# Patient Record
Sex: FEMALE | Race: WHITE | URBAN - METROPOLITAN AREA
[De-identification: names, ages, dates, MRNs, and addresses within clinical notes are randomized per-mention and may not be internally consistent; named-entity substitution may affect disease eponyms.]

---

## 2019-01-06 ENCOUNTER — INPATIENT (INPATIENT)
Facility: HOSPITAL | Age: 84
LOS: 0 days | Discharge: HOME HEALTH | End: 2019-01-07
Attending: FAMILY MEDICINE | Admitting: FAMILY MEDICINE
Payer: MEDICARE

## 2019-01-06 VITALS
DIASTOLIC BLOOD PRESSURE: 119 MMHG | RESPIRATION RATE: 16 BRPM | WEIGHT: 139.99 LBS | TEMPERATURE: 99 F | OXYGEN SATURATION: 100 % | SYSTOLIC BLOOD PRESSURE: 199 MMHG | HEART RATE: 90 BPM

## 2019-01-06 DIAGNOSIS — I10 ESSENTIAL (PRIMARY) HYPERTENSION: ICD-10-CM

## 2019-01-06 DIAGNOSIS — Z96.649 PRESENCE OF UNSPECIFIED ARTIFICIAL HIP JOINT: Chronic | ICD-10-CM

## 2019-01-06 DIAGNOSIS — Z98.890 OTHER SPECIFIED POSTPROCEDURAL STATES: Chronic | ICD-10-CM

## 2019-01-06 DIAGNOSIS — G20 PARKINSON'S DISEASE: ICD-10-CM

## 2019-01-06 DIAGNOSIS — Z29.9 ENCOUNTER FOR PROPHYLACTIC MEASURES, UNSPECIFIED: ICD-10-CM

## 2019-01-06 DIAGNOSIS — I48.91 UNSPECIFIED ATRIAL FIBRILLATION: ICD-10-CM

## 2019-01-06 DIAGNOSIS — G45.9 TRANSIENT CEREBRAL ISCHEMIC ATTACK, UNSPECIFIED: ICD-10-CM

## 2019-01-06 DIAGNOSIS — Z71.89 OTHER SPECIFIED COUNSELING: ICD-10-CM

## 2019-01-06 LAB
ALBUMIN SERPL ELPH-MCNC: 3.5 G/DL — SIGNIFICANT CHANGE UP (ref 3.3–5)
ALP SERPL-CCNC: 122 U/L — HIGH (ref 40–120)
ALT FLD-CCNC: 48 U/L — SIGNIFICANT CHANGE UP (ref 12–78)
ANION GAP SERPL CALC-SCNC: 7 MMOL/L — SIGNIFICANT CHANGE UP (ref 5–17)
APPEARANCE UR: CLEAR — SIGNIFICANT CHANGE UP
APTT BLD: 28.3 SEC — SIGNIFICANT CHANGE UP (ref 27.5–36.3)
AST SERPL-CCNC: 39 U/L — HIGH (ref 15–37)
BACTERIA # UR AUTO: ABNORMAL
BASOPHILS # BLD AUTO: 0.07 K/UL — SIGNIFICANT CHANGE UP (ref 0–0.2)
BASOPHILS NFR BLD AUTO: 1.1 % — SIGNIFICANT CHANGE UP (ref 0–2)
BILIRUB SERPL-MCNC: 0.5 MG/DL — SIGNIFICANT CHANGE UP (ref 0.2–1.2)
BILIRUB UR-MCNC: NEGATIVE — SIGNIFICANT CHANGE UP
BUN SERPL-MCNC: 24 MG/DL — HIGH (ref 7–23)
CALCIUM SERPL-MCNC: 9.1 MG/DL — SIGNIFICANT CHANGE UP (ref 8.5–10.1)
CHLORIDE SERPL-SCNC: 107 MMOL/L — SIGNIFICANT CHANGE UP (ref 96–108)
CO2 SERPL-SCNC: 28 MMOL/L — SIGNIFICANT CHANGE UP (ref 22–31)
COLOR SPEC: YELLOW — SIGNIFICANT CHANGE UP
COMMENT - URINE: SIGNIFICANT CHANGE UP
CREAT SERPL-MCNC: 0.79 MG/DL — SIGNIFICANT CHANGE UP (ref 0.5–1.3)
DIFF PNL FLD: ABNORMAL
EOSINOPHIL # BLD AUTO: 0.03 K/UL — SIGNIFICANT CHANGE UP (ref 0–0.5)
EOSINOPHIL NFR BLD AUTO: 0.5 % — SIGNIFICANT CHANGE UP (ref 0–6)
EPI CELLS # UR: SIGNIFICANT CHANGE UP
ETHANOL SERPL-MCNC: <10 MG/DL — SIGNIFICANT CHANGE UP (ref 0–10)
GLUCOSE SERPL-MCNC: 113 MG/DL — HIGH (ref 70–99)
GLUCOSE UR QL: NEGATIVE MG/DL — SIGNIFICANT CHANGE UP
HCT VFR BLD CALC: 38.3 % — SIGNIFICANT CHANGE UP (ref 34.5–45)
HGB BLD-MCNC: 12.2 G/DL — SIGNIFICANT CHANGE UP (ref 11.5–15.5)
IMM GRANULOCYTES NFR BLD AUTO: 0.5 % — SIGNIFICANT CHANGE UP (ref 0–1.5)
INR BLD: 1.09 RATIO — SIGNIFICANT CHANGE UP (ref 0.88–1.16)
KETONES UR-MCNC: NEGATIVE — SIGNIFICANT CHANGE UP
LACTATE SERPL-SCNC: 1.9 MMOL/L — SIGNIFICANT CHANGE UP (ref 0.7–2)
LEUKOCYTE ESTERASE UR-ACNC: ABNORMAL
LYMPHOCYTES # BLD AUTO: 1.32 K/UL — SIGNIFICANT CHANGE UP (ref 1–3.3)
LYMPHOCYTES # BLD AUTO: 19.9 % — SIGNIFICANT CHANGE UP (ref 13–44)
MCHC RBC-ENTMCNC: 31.5 PG — SIGNIFICANT CHANGE UP (ref 27–34)
MCHC RBC-ENTMCNC: 31.9 GM/DL — LOW (ref 32–36)
MCV RBC AUTO: 99 FL — SIGNIFICANT CHANGE UP (ref 80–100)
MONOCYTES # BLD AUTO: 0.62 K/UL — SIGNIFICANT CHANGE UP (ref 0–0.9)
MONOCYTES NFR BLD AUTO: 9.4 % — SIGNIFICANT CHANGE UP (ref 2–14)
NEUTROPHILS # BLD AUTO: 4.55 K/UL — SIGNIFICANT CHANGE UP (ref 1.8–7.4)
NEUTROPHILS NFR BLD AUTO: 68.6 % — SIGNIFICANT CHANGE UP (ref 43–77)
NITRITE UR-MCNC: NEGATIVE — SIGNIFICANT CHANGE UP
NRBC # BLD: 0 /100 WBCS — SIGNIFICANT CHANGE UP (ref 0–0)
PH UR: 6 — SIGNIFICANT CHANGE UP (ref 5–8)
PLATELET # BLD AUTO: 195 K/UL — SIGNIFICANT CHANGE UP (ref 150–400)
POTASSIUM SERPL-MCNC: 3.9 MMOL/L — SIGNIFICANT CHANGE UP (ref 3.5–5.3)
POTASSIUM SERPL-SCNC: 3.9 MMOL/L — SIGNIFICANT CHANGE UP (ref 3.5–5.3)
PROT SERPL-MCNC: 7.3 GM/DL — SIGNIFICANT CHANGE UP (ref 6–8.3)
PROT UR-MCNC: 15 MG/DL
PROTHROM AB SERPL-ACNC: 12.1 SEC — SIGNIFICANT CHANGE UP (ref 10–12.9)
RBC # BLD: 3.87 M/UL — SIGNIFICANT CHANGE UP (ref 3.8–5.2)
RBC # FLD: 14.9 % — HIGH (ref 10.3–14.5)
RBC CASTS # UR COMP ASSIST: SIGNIFICANT CHANGE UP /HPF (ref 0–4)
SODIUM SERPL-SCNC: 142 MMOL/L — SIGNIFICANT CHANGE UP (ref 135–145)
SP GR SPEC: 1.02 — SIGNIFICANT CHANGE UP (ref 1.01–1.02)
TROPONIN I SERPL-MCNC: 0.01 NG/ML — SIGNIFICANT CHANGE UP (ref 0.01–0.04)
TROPONIN I SERPL-MCNC: <0.015 NG/ML — SIGNIFICANT CHANGE UP (ref 0.01–0.04)
UROBILINOGEN FLD QL: NEGATIVE MG/DL — SIGNIFICANT CHANGE UP
WBC # BLD: 6.62 K/UL — SIGNIFICANT CHANGE UP (ref 3.8–10.5)
WBC # FLD AUTO: 6.62 K/UL — SIGNIFICANT CHANGE UP (ref 3.8–10.5)
WBC UR QL: SIGNIFICANT CHANGE UP

## 2019-01-06 PROCEDURE — 70450 CT HEAD/BRAIN W/O DYE: CPT | Mod: 26

## 2019-01-06 PROCEDURE — 99284 EMERGENCY DEPT VISIT MOD MDM: CPT

## 2019-01-06 PROCEDURE — 99285 EMERGENCY DEPT VISIT HI MDM: CPT

## 2019-01-06 PROCEDURE — 93010 ELECTROCARDIOGRAM REPORT: CPT

## 2019-01-06 RX ORDER — HEPARIN SODIUM 5000 [USP'U]/ML
5000 INJECTION INTRAVENOUS; SUBCUTANEOUS EVERY 12 HOURS
Qty: 0 | Refills: 0 | Status: DISCONTINUED | OUTPATIENT
Start: 2019-01-06 | End: 2019-01-07

## 2019-01-06 RX ORDER — ASPIRIN/CALCIUM CARB/MAGNESIUM 324 MG
325 TABLET ORAL ONCE
Qty: 0 | Refills: 0 | Status: COMPLETED | OUTPATIENT
Start: 2019-01-06 | End: 2019-01-06

## 2019-01-06 RX ORDER — ASPIRIN/CALCIUM CARB/MAGNESIUM 324 MG
325 TABLET ORAL DAILY
Qty: 0 | Refills: 0 | Status: DISCONTINUED | OUTPATIENT
Start: 2019-01-07 | End: 2019-01-07

## 2019-01-06 RX ADMIN — Medication 325 MILLIGRAM(S): at 22:36

## 2019-01-06 RX ADMIN — HEPARIN SODIUM 5000 UNIT(S): 5000 INJECTION INTRAVENOUS; SUBCUTANEOUS at 22:37

## 2019-01-06 NOTE — ED PROVIDER NOTE - NS_ ATTENDINGSCRIBEDETAILS _ED_A_ED_FT
I Fausto Reddy MD saw and examined the patient. Scribe documented for me and under my supervision. I have modified the scribe's documentation where necessary to reflect my history, physical exam and other relevant documentations pertinent to the care of the patient.

## 2019-01-06 NOTE — ED ADULT NURSE NOTE - NSIMPLEMENTINTERV_GEN_ALL_ED
Implemented All Fall with Harm Risk Interventions:  [place] to call system. Call bell, personal items and telephone within reach. Instruct patient to call for assistance. Room bathroom lighting operational. Non-slip footwear when patient is off stretcher. Physically safe environment: no spills, clutter or unnecessary equipment. Stretcher in lowest position, wheels locked, appropriate side rails in place. Provide visual cue, wrist band, yellow gown, etc. Monitor gait and stability. Monitor for mental status changes and reorient to person, place, and time. Review medications for side effects contributing to fall risk. Reinforce activity limits and safety measures with patient and family. Provide visual clues: red socks.

## 2019-01-06 NOTE — H&P ADULT - PROBLEM SELECTOR PLAN 2
~cont. to monitor  ~not currently on any known home medications ~cont. ASA 325mg po daily  ~f/u w/ Cardiology in the am  ~currently rate controlled  ~[id number] Score is 6 (patient will likely require DOAC therapy)

## 2019-01-06 NOTE — ED ADULT NURSE NOTE - CHPI ED NUR SYMPTOMS NEG
no vomiting/no loss of consciousness/no nausea/no numbness/no blurred vision/no dizziness/no fever/no weakness

## 2019-01-06 NOTE — H&P ADULT - PROBLEM SELECTOR PLAN 4
IMPROVE VTE Risk Score is 2  [  ] Previous VTE                                                  3  [  ] Thrombophilia                                               2  [  ] Lower limb paralysis                                      2        (unable to hold up >15 seconds)    [  ] Current Cancer                                              2         (within 6 months)  [X] Immobilization > 24 hrs                                1  [  ] ICU/CCU stay > 24 hours                              1  [X] Age > 60                                                      1  ~cont. Heparin sq ~cont. to monitor  ~not currently on any known home medications ~cont. to monitor  ~not currently on any known home medications  ~BP Target =>  systolic blood pressure =185 mmHg and diastolic blood pressure =110 mmHg

## 2019-01-06 NOTE — ED PROVIDER NOTE - OBJECTIVE STATEMENT
89 y/o f with PMHx of Parkinson's dementia, HTN, osteopenia presenting to the ED BIB son c/o confusion today. Pt found by son in chair, possibly having slid out of chair, confused and unable to  her cellphone from floor when asked for it. At baseline, pt able to answer questions and able to follow commands. Baseline retrograde dementia, unable to recall certain things but able to recall  address, season, however, today she is unable to do either. No focal necrologic deficits or facial droop. 89 y/o f with PMHx of Parkinson's dementia, HTN, osteopenia presenting to the ED BIB son c/o confusion today. Pt found by son in chair, possibly having slid out of chair, confused and unable to  her cellphone from floor when asked for it. At baseline, pt able to answer questions and able to follow commands. Baseline retrograde dementia, unable to recall certain things but able to recall  address, season, however, today she is unable to do either. No focal necrologic deficits or facial droop. Pt unable to provide hx secondary to current mental status, hx obtained from family at bedside.

## 2019-01-06 NOTE — PROVIDER CONTACT NOTE (OTHER) - SITUATION
Consult for Dr. Palla. S/w Lori from [facility]
89 y/o F w/ confusion and expressive aphasia  Service aware - [place]

## 2019-01-06 NOTE — H&P ADULT - ASSESSMENT
89 y/o F PMHx significant for Parkinson's dementia, HTN, osteopenia BIB family for further evaluation of increased confusion. As per patient's family at the bedside the patient was reportedly increasingly "confused" unable to follow commands, or answer questions appropriately. In triage code stroke was called. Vital signs => /119, HR 90/min, RR 16/min. CT Head => No acute pathology to note. Chronic lacunar infarcts in bilateral basal ganglia are present. There is periventricular and subcortical white matter hypoattenuation,  most compatible with severe chronic microvascular ischemic changes. Labs => BUN/Cr 24/0.79.

## 2019-01-06 NOTE — PATIENT PROFILE ADULT - NSPROSPHOSPCHAPLAINYN_GEN_A_NUR
I have reviewed discharge instructions with the parent. The parent verbalized understanding.  Pt ambulated out of Ed in stable condition with no distress noted and no complaints voiced with mother
no

## 2019-01-06 NOTE — CONSULT NOTE ADULT - ASSESSMENT
Patient is a 89 y/o female PMHx of dementia, HTN, osteopenia presenting to the ED BIB son c/o confusion and expressive aphasia today at 3pm. A code stroke was called, HCT negative for acute infarct, chronic b/l BG lacunar infarct present.    A/P: TIA vs acute delirium   - No IV tpa given due to resolving sxs  - Admit to tele  - BP elevated in /102, son states patient was previously on HCTZ  - Cardiac consult  - cardiac echo  - Start ASA 325mg  - Full dose Statin  - Dysphagia screen  - SQH, SCDs for DVT prophylaxis  - MRI/A brain-carotids w/o gado  - PT eval  - Speech/swallow eval    D/w Dr. Swanson 89 y/o female PMHx of dementia, HTN, osteopenia presenting to the ED at 16.41 PM BIB son c/o confusion and expressive aphasia today noted at 3.00 pm. A code stroke was called, HCT negative for acute infarct, chronic b/l BG lacunar infarct present. NIHSS -1, aphasia improved on exam.    # TIA vs acute delirium; No IV tpa given due to resolving sxs    - Admit to tele  - BP elevated in /102, son states patient was previously on HCTZ, optImize BP  - Cardiac consult  - cardiac echo  - Start ASA 325mg  - Full dose Statin  - Dysphagia screen  - SQH, SCDs for DVT prophylaxis  - MRI/A brain-carotids w/o gado  - PT eval  - Speech/swallow eval    Neurology attending:  ------------------------------  Pt seen and examined, total resolution of aphasia, now in A. fib  Agree with above plan    D/W pts son and Dr. Neal

## 2019-01-06 NOTE — ED PROVIDER NOTE - ATTENDING CONTRIBUTION TO CARE
I Fausto Reddy MD saw and examined the patient. MLP saw and examined the patient under my supervision. I discussed the care of the patient with MLP and agree with MLP's plan, assessment and care of the patient while in the ED.

## 2019-01-06 NOTE — ED ADULT TRIAGE NOTE - CHIEF COMPLAINT QUOTE
Patient presents with expressive aphasia since 3 pm, patient was sitting at table with family and was unsure of what her phone was and was not answering questions as quickly as usual. Patient does not have unilateral weakness, coherent clear speech. Patient evaluated by Dr Reddy evaluated at triage called code stroke

## 2019-01-06 NOTE — H&P ADULT - PROBLEM SELECTOR PLAN 1
~admit to Telemetry  ~serial Malaika/EKGs  ~f/u w/ Neurology in the am  ~neuro checks q4h  ~fall precautions  ~aspiration precautions   ~dysphagia screen  ~as per Neurology f/u MR Brain  ~f/u MRA H+N  ~no rtPA given as patient's NIHSS returned to 0 in the ED  ~f/u lipid profile  ~f/u TFTs  ~cont. ASA 325mg po daily

## 2019-01-06 NOTE — ED ADULT NURSE NOTE - ED STAT RN HANDOFF DETAILS
Received report from SOPHIE Ruff and reassessed patient. Agree with the previous RN assessment. Patient has an NIH at 0. A+Ox4 and is at her baseline status. Patient is awaiting hospitalist MD admission assessment and bed placement. Patient and family updated on status and plan of care.

## 2019-01-06 NOTE — ED PROVIDER NOTE - CARE PLAN
Principal Discharge DX:	TIA (transient ischemic attack)  Secondary Diagnosis:	Altered mental status, unspecified altered mental status type

## 2019-01-06 NOTE — H&P ADULT - HISTORY OF PRESENT ILLNESS
89 y/o F PMHx significant for Parkinson's dementia, HTN, osteopenia BIB family for further evaluation of increased confusion. As per patient's family at the bedside the patient was reportedly increasingly "confused" unable to follow commands, or answer questions appropriately. In triage code stroke was called. Vital signs => /119, HR 90/min, RR 16/min. CT Head => No acute pathology to note. Chronic lacunar infarcts in bilateral basal ganglia are present. There is periventricular and subcortical white matter hypoattenuation,  most compatible with severe chronic microvascular ischemic changes. Labs => BUN/Cr 24/0.79. 89 y/o F PMHx significant for Parkinson's dementia, HTN, osteopenia BIB family for further evaluation of increased confusion. As per patient's family at the bedside the patient was reportedly increasingly "confused" unable to follow commands, or answer questions appropriately. In triage code stroke was called. Vital signs => /119, HR 90/min, RR 16/min. CT Head => No acute pathology to note. Chronic lacunar infarcts in bilateral basal ganglia are present. There is periventricular and subcortical white matter hypoattenuation,  most compatible with severe chronic microvascular ischemic changes. Labs => BUN/Cr 24/0.79. NIHSS was 0. 89 y/o F PMHx significant for Dementia, HTN, osteopenia BIB family for further evaluation of increased confusion. As per patient's family at the bedside the patient was reportedly increasingly "confused" unable to follow commands, or answer questions appropriately. In triage code stroke was called. Vital signs => /119, HR 90/min, RR 16/min. CT Head => No acute pathology to note. Chronic lacunar infarcts in bilateral basal ganglia are present. There is periventricular and subcortical white matter hypoattenuation,  most compatible with severe chronic microvascular ischemic changes. Labs => BUN/Cr 24/0.79. NIHSS was 0.

## 2019-01-06 NOTE — ED ADULT NURSE NOTE - OBJECTIVE STATEMENT
pt is a 89 y/o female w/ pmhx of Parkinsons dementia (with short term memory loss), and HTN, presenting today for confusion and difficulty following basic instruction at approx 1500 today. per family maintained consciousness and did not exhibit slurring of speech, aphasia or dysarthria. pt awake alert and conversive at this time oriented x2. pt speech is clear and without hesitation. no new facial asymmetry  (right lower lip tremor per baseline). no unilateral weakness or neglect. no focal deficits.

## 2019-01-06 NOTE — ED PROVIDER NOTE - NEUROLOGICAL, MLM
Awake, alert and oriented to self only, no focal deficits, no motor or sensory deficits, cranial nerves 2-12 intact, finger to nose motion intact, no nuchal rigidity, GCS of 15 Awake, alert and oriented to self only, no focal deficits, no motor or sensory deficits, cranial nerves 2-12 intact, finger to nose motion intact, no nuchal rigidity, GCS of 15, NIH 0

## 2019-01-06 NOTE — H&P ADULT - PROBLEM SELECTOR PLAN 5
IMPROVE VTE Risk Score is 2  [  ] Previous VTE                                                  3  [  ] Thrombophilia                                               2  [  ] Lower limb paralysis                                      2        (unable to hold up >15 seconds)    [  ] Current Cancer                                              2         (within 6 months)  [X] Immobilization > 24 hrs                                1  [  ] ICU/CCU stay > 24 hours                              1  [X] Age > 60                                                      1  ~cont. Heparin sq ~as per patient's son at the bedside the patient's best known wishes concerning code status were to be DNR/DNI. MOLST forms as well as capacity were signed per protocol.  Total time; 16minutes

## 2019-01-06 NOTE — H&P ADULT - NSHPPHYSICALEXAM_GEN_ALL_CORE
Vital Signs Last 24 Hrs  T(C): 37.1 (06 Jan 2019 16:41), Max: 37.1 (06 Jan 2019 16:41)  T(F): 98.7 (06 Jan 2019 16:41), Max: 98.7 (06 Jan 2019 16:41)  HR: 90 (06 Jan 2019 16:41) (90 - 90)  BP: 199/119 (06 Jan 2019 16:41) (199/119 - 199/119)  RR: 16 (06 Jan 2019 16:41) (16 - 16)  SpO2: 100% (06 Jan 2019 16:41) (100% - 100%)

## 2019-01-06 NOTE — ED ADULT NURSE REASSESSMENT NOTE - NS ED NURSE REASSESS COMMENT FT1
pt evaled by neuro PA, is not a candidate for TPA at this time. pending further work up. pt assisted to bedpan and provided for pericare. will cont to monitor.

## 2019-01-07 ENCOUNTER — TRANSCRIPTION ENCOUNTER (OUTPATIENT)
Age: 84
End: 2019-01-07

## 2019-01-07 VITALS — WEIGHT: 141.1 LBS

## 2019-01-07 DIAGNOSIS — F03.90 UNSPECIFIED DEMENTIA WITHOUT BEHAVIORAL DISTURBANCE: ICD-10-CM

## 2019-01-07 DIAGNOSIS — I10 ESSENTIAL (PRIMARY) HYPERTENSION: ICD-10-CM

## 2019-01-07 LAB
ALBUMIN SERPL ELPH-MCNC: 2.9 G/DL — LOW (ref 3.3–5)
ALP SERPL-CCNC: 104 U/L — SIGNIFICANT CHANGE UP (ref 40–120)
ALT FLD-CCNC: 38 U/L — SIGNIFICANT CHANGE UP (ref 12–78)
ANION GAP SERPL CALC-SCNC: 7 MMOL/L — SIGNIFICANT CHANGE UP (ref 5–17)
AST SERPL-CCNC: 27 U/L — SIGNIFICANT CHANGE UP (ref 15–37)
BASOPHILS # BLD AUTO: 0.05 K/UL — SIGNIFICANT CHANGE UP (ref 0–0.2)
BASOPHILS NFR BLD AUTO: 0.8 % — SIGNIFICANT CHANGE UP (ref 0–2)
BILIRUB SERPL-MCNC: 0.8 MG/DL — SIGNIFICANT CHANGE UP (ref 0.2–1.2)
BUN SERPL-MCNC: 15 MG/DL — SIGNIFICANT CHANGE UP (ref 7–23)
CALCIUM SERPL-MCNC: 8.7 MG/DL — SIGNIFICANT CHANGE UP (ref 8.5–10.1)
CHLORIDE SERPL-SCNC: 107 MMOL/L — SIGNIFICANT CHANGE UP (ref 96–108)
CHOLEST SERPL-MCNC: 176 MG/DL — SIGNIFICANT CHANGE UP (ref 10–199)
CO2 SERPL-SCNC: 29 MMOL/L — SIGNIFICANT CHANGE UP (ref 22–31)
CREAT SERPL-MCNC: 0.7 MG/DL — SIGNIFICANT CHANGE UP (ref 0.5–1.3)
EOSINOPHIL # BLD AUTO: 0.08 K/UL — SIGNIFICANT CHANGE UP (ref 0–0.5)
EOSINOPHIL NFR BLD AUTO: 1.2 % — SIGNIFICANT CHANGE UP (ref 0–6)
FOLATE SERPL-MCNC: >20 NG/ML — SIGNIFICANT CHANGE UP
GLUCOSE SERPL-MCNC: 106 MG/DL — HIGH (ref 70–99)
HBA1C BLD-MCNC: 6.3 % — HIGH (ref 4–5.6)
HCT VFR BLD CALC: 35.4 % — SIGNIFICANT CHANGE UP (ref 34.5–45)
HDLC SERPL-MCNC: 62 MG/DL — SIGNIFICANT CHANGE UP
HGB BLD-MCNC: 11.3 G/DL — LOW (ref 11.5–15.5)
IMM GRANULOCYTES NFR BLD AUTO: 0.3 % — SIGNIFICANT CHANGE UP (ref 0–1.5)
LIPID PNL WITH DIRECT LDL SERPL: 104 MG/DL — SIGNIFICANT CHANGE UP
LYMPHOCYTES # BLD AUTO: 1.53 K/UL — SIGNIFICANT CHANGE UP (ref 1–3.3)
LYMPHOCYTES # BLD AUTO: 23.4 % — SIGNIFICANT CHANGE UP (ref 13–44)
MCHC RBC-ENTMCNC: 30.8 PG — SIGNIFICANT CHANGE UP (ref 27–34)
MCHC RBC-ENTMCNC: 31.9 GM/DL — LOW (ref 32–36)
MCV RBC AUTO: 96.5 FL — SIGNIFICANT CHANGE UP (ref 80–100)
MONOCYTES # BLD AUTO: 0.69 K/UL — SIGNIFICANT CHANGE UP (ref 0–0.9)
MONOCYTES NFR BLD AUTO: 10.6 % — SIGNIFICANT CHANGE UP (ref 2–14)
NEUTROPHILS # BLD AUTO: 4.17 K/UL — SIGNIFICANT CHANGE UP (ref 1.8–7.4)
NEUTROPHILS NFR BLD AUTO: 63.7 % — SIGNIFICANT CHANGE UP (ref 43–77)
NRBC # BLD: 0 /100 WBCS — SIGNIFICANT CHANGE UP (ref 0–0)
PLATELET # BLD AUTO: 191 K/UL — SIGNIFICANT CHANGE UP (ref 150–400)
POTASSIUM SERPL-MCNC: 3.7 MMOL/L — SIGNIFICANT CHANGE UP (ref 3.5–5.3)
POTASSIUM SERPL-SCNC: 3.7 MMOL/L — SIGNIFICANT CHANGE UP (ref 3.5–5.3)
PROT SERPL-MCNC: 6.3 GM/DL — SIGNIFICANT CHANGE UP (ref 6–8.3)
RBC # BLD: 3.67 M/UL — LOW (ref 3.8–5.2)
RBC # FLD: 14.8 % — HIGH (ref 10.3–14.5)
SODIUM SERPL-SCNC: 143 MMOL/L — SIGNIFICANT CHANGE UP (ref 135–145)
TOTAL CHOLESTEROL/HDL RATIO MEASUREMENT: 2.8 RATIO — LOW (ref 3.3–7.1)
TRIGL SERPL-MCNC: 51 MG/DL — SIGNIFICANT CHANGE UP (ref 10–149)
TSH SERPL-MCNC: 1.15 UU/ML — SIGNIFICANT CHANGE UP (ref 0.34–4.82)
VIT B12 SERPL-MCNC: 480 PG/ML — SIGNIFICANT CHANGE UP (ref 232–1245)
WBC # BLD: 6.54 K/UL — SIGNIFICANT CHANGE UP (ref 3.8–10.5)
WBC # FLD AUTO: 6.54 K/UL — SIGNIFICANT CHANGE UP (ref 3.8–10.5)

## 2019-01-07 PROCEDURE — 93306 TTE W/DOPPLER COMPLETE: CPT | Mod: 26

## 2019-01-07 PROCEDURE — 99223 1ST HOSP IP/OBS HIGH 75: CPT

## 2019-01-07 PROCEDURE — 99232 SBSQ HOSP IP/OBS MODERATE 35: CPT

## 2019-01-07 RX ORDER — METOPROLOL TARTRATE 50 MG
50 TABLET ORAL DAILY
Qty: 0 | Refills: 0 | Status: DISCONTINUED | OUTPATIENT
Start: 2019-01-07 | End: 2019-01-07

## 2019-01-07 RX ORDER — METOPROLOL TARTRATE 50 MG
1 TABLET ORAL
Qty: 14 | Refills: 0 | OUTPATIENT
Start: 2019-01-07 | End: 2019-01-20

## 2019-01-07 RX ORDER — ATORVASTATIN CALCIUM 80 MG/1
1 TABLET, FILM COATED ORAL
Qty: 14 | Refills: 0 | OUTPATIENT
Start: 2019-01-07 | End: 2019-01-20

## 2019-01-07 RX ORDER — ASPIRIN/CALCIUM CARB/MAGNESIUM 324 MG
1 TABLET ORAL
Qty: 0 | Refills: 0 | COMMUNITY
Start: 2019-01-07

## 2019-01-07 RX ORDER — ATORVASTATIN CALCIUM 80 MG/1
40 TABLET, FILM COATED ORAL AT BEDTIME
Qty: 0 | Refills: 0 | Status: DISCONTINUED | OUTPATIENT
Start: 2019-01-07 | End: 2019-01-07

## 2019-01-07 RX ADMIN — HEPARIN SODIUM 5000 UNIT(S): 5000 INJECTION INTRAVENOUS; SUBCUTANEOUS at 05:43

## 2019-01-07 RX ADMIN — Medication 325 MILLIGRAM(S): at 13:39

## 2019-01-07 NOTE — CHART NOTE - NSCHARTNOTEFT_GEN_A_CORE
Upon Nutritional Assessment by the Registered Dietitian your patient was determined to meet criteria / has evidence of the following diagnosis/diagnoses:          [ ]  Mild Protein Calorie Malnutrition        [ ]  Moderate Protein Calorie Malnutrition        [ x] Severe Protein Calorie Malnutrition        [ ] Unspecified Protein Calorie Malnutrition        [ ] Underweight / BMI <19        [ ] Morbid Obesity / BMI > 40      Findings as based on:  •  Comprehensive nutrition assessment and consultation  •  Calorie counts (nutrient intake analysis)  •  Food acceptance and intake status from observations by staff  •  Follow up  •  Patient education  •  Intervention secondary to interdisciplinary rounds  •   concerns    Pt meets criteria for severe protein-calorie malnutrition in context of chronic disease .   nutrition focused physical exam reveals   moderate muscle wasting (temples, interosseus, clavicles, shoulders, scapula, thighs, calves),   moderate/severe  fat depletion (triceps, ribs).    PO intake < 75% nutritional needs > one month.      Treatment:    The following diet has been recommended:  Suggest advance diet to texture recommended by SLP, consult is in place.    Suggest add Ensure Clear tid.  Pt currently on clear liquid diet.    Suggest weekly weights.   Record PO intake in EMR after each meal.    Will monitor labs, meds, wt, PO intake.      PROVIDER Section:     By signing this assessment you are acknowledging and agree with the diagnosis/diagnoses assigned by the Registered Dietitian    Comments:

## 2019-01-07 NOTE — DIETITIAN INITIAL EVALUATION ADULT. - OTHER INFO
Consult for assessment. Pt is 89 y/o F PMHx significant for Dementia, HTN, osteopenia BIB family for further evaluation of increased confusion. As per patient's family at the bedside the patient was reportedly increasingly "confused" unable to follow commands, or answer questions appropriately. In triage code stroke was called.   per EMR, TID, AFib, dementia, HTN. Pt is DNR/DNI.  SLP consult ordered.  Edema 1+ left and right ankle.  Aryan 16.  Skin intact. Consult for assessment. Pt is 89 y/o F PMHx significant for Dementia, HTN, osteopenia BIB family for further evaluation of increased confusion. As per patient's family at the bedside the patient was reportedly increasingly "confused" unable to follow commands, or answer questions appropriately. In triage code stroke was called.   per EMR, TID, AFib, dementia, HTN. Pt is DNR/DNI.  SLP consult ordered.  Edema 1+ left and right ankle.  Aryan 16.  Skin intact.  Pt meets criteria for severe protein-calorie malnutrition in context of chronic disease .  nutrition focused physical exam reveals moderate muscle wasting (temples, interosseus, clavicles, shoulders, scapula, thighs, calves), moderate/severe  fat depletion (triceps, ribs).  PO intake < 75% nutritional needs > one month.  Suggest advance diet to texture recommended by SLP, consult is in place.  Suggest add Ensure Clear tid.  Pt currently on clear liquid diet.  Suggest weekly weights.  Record PO intake in EMR after each meal.  Will monitor. Monitor lytes, meds, wt, PO intake.

## 2019-01-07 NOTE — CONSULT NOTE ADULT - PROBLEM SELECTOR RECOMMENDATION 9
Has elevated Skyler vasc score and not completely clarified if event a CVA versus a TIA.  After discussion with granddaughter, fall risk is significant as well and she reports pt. is unlikely to be compliant with medication.  DOAC is indicated but will likely on receive aspirin for reasons stated above.  Dr. Damico will discuss with son who is HCP.  Rate control with BB as this will work for HTN as well.

## 2019-01-07 NOTE — DISCHARGE NOTE ADULT - MEDICATION SUMMARY - MEDICATIONS TO TAKE
I will START or STAY ON the medications listed below when I get home from the hospital:    aspirin 325 mg oral delayed release tablet  -- 1 tab(s) by mouth once a day  -- Indication: For TIA (transient ischemic attack)    atorvastatin 40 mg oral tablet  -- 1 tab(s) by mouth once a day (at bedtime)  -- Indication: For TIA (transient ischemic attack)    metoprolol succinate 50 mg oral tablet, extended release  -- 1 tab(s) by mouth once a day  -- Indication: For Atrial fibrillation

## 2019-01-07 NOTE — DISCHARGE NOTE ADULT - DURABLE MEDICAL EQUIPMENT AGENCY
Patient requested a RW.  Still pending [facility] approval.   Pt's niece (Katarzyna) will deliver to the home once approved by [facility].

## 2019-01-07 NOTE — CONSULT NOTE ADULT - PROBLEM SELECTOR RECOMMENDATION 2
As noted above.  No MRI as unknown if hip replacement is compatible with MRI. (25 yrs ago it was done and no records for it).

## 2019-01-07 NOTE — PROGRESS NOTE ADULT - SUBJECTIVE AND OBJECTIVE BOX
CC:  Patient is a 89y old  Female who presents with a chief complaint of Confusion, Expressive Aphasia (07 Jan 2019 10:34)    SUBJECTIVE:     ROS:  all other review of systems are negative unless indicated above.    aspirin enteric coated 325 milliGRAM(s) Oral daily  atorvastatin 40 milliGRAM(s) Oral at bedtime  heparin  Injectable 5000 Unit(s) SubCutaneous every 12 hours    T(C): 36.3 (01-07-19 @ 11:18), Max: 37.1 (01-06-19 @ 16:41)  HR: 90 (01-07-19 @ 11:18) (77 - 94)  BP: 153/70 (01-07-19 @ 11:18) (153/70 - 199/119)  RR: 18 (01-07-19 @ 11:18) (14 - 18)  SpO2: 100% (01-07-19 @ 11:18) (97% - 100%)    Constitutional: NAD.   HEENT: PERRL, EOMI, MMM.  Neck: Soft and supple, No carotid bruit, No JVD  Respiratory: Breath sounds are clear bilaterally, No wheezing, rales or rhonchi  Cardiovascular: S1 and S2, regular rate and rhythm, no murmur, rub or gallop.  Gastrointestinal: Bowel Sounds present, soft, nontender, nondistended, no guarding, no rebound, no mass.  Extremities: No peripheral edema  Vascular: 2+ peripheral pulses  Neurological: A/O x , no focal deficits  Musculoskeletal: 5/5 strength b/l upper and lower extremities  Skin:  no visible rashes.                         11.3   6.54  )-----------( 191      ( 07 Jan 2019 05:35 )             35.4     PT/INR - ( 06 Jan 2019 17:08 )   PT: 12.1 sec;   INR: 1.09 ratio         PTT - ( 06 Jan 2019 17:08 )  PTT:28.3 sec  01-07    143  |  107  |  15  ----------------------------<  106<H>  3.7   |  29  |  0.70    Ca    8.7      07 Jan 2019 05:35    TPro  6.3  /  Alb  2.9<L>  /  TBili  0.8  /  DBili  x   /  AST  27  /  ALT  38  /  AlkPhos  104  01-07

## 2019-01-07 NOTE — PROGRESS NOTE ADULT - ASSESSMENT
89F.  admitted 01/06/19.  presented to ED c/o confusion.  family noted gradual onset.  was unable to follow commands or answer questions appropriately.  in ED, NIHSS was 0.    PMHx:  HTN;  dementia;  osteopenia.    TIA.  -telemetry monitoring.  -MR brain.  -MRA COW + neck.  -ASA + statin.  -speech pathology evaluation.  -PT.  -Neurology.    AF.  -[id number]-PYHv Score is 6.  -will start DOAC pending MR brain.  -currently rate controlled.    DVT prophylaxis.  -UFH sq.    disposition.  -3N.    communication.  -RN.  -Case Management.  -Neurology. 89F.  admitted 01/06/19.  presented to ED c/o confusion.  family noted gradual onset.  was unable to follow commands or answer questions appropriately.  in ED, NIHSS was 0.    PMHx:  HTN;  dementia;  osteopenia.    TIA.  -telemetry monitoring.  -echocardiogram.  -MR brain.  -MRA COW + neck.  -ASA + statin.  -speech pathology evaluation.  -PT.  -Neurology.    AF.  -[id number] Score is 6.  -will start DOAC pending MR brain.  -currently rate controlled.    DVT prophylaxis.  -UFH sq.    disposition.  -3N.    communication.  -RN.  -Case Management.  -Neurology.

## 2019-01-07 NOTE — DISCHARGE NOTE ADULT - CARE PROVIDER_API CALL
Ifeanyi Bai (MD), Cardiovascular Disease  [address] [place] [address]  Phone: [phone]  Fax: [phone]

## 2019-01-07 NOTE — DISCHARGE NOTE ADULT - CARE PLAN
Principal Discharge DX:	Afib  Goal:	.  Assessment and plan of treatment:	full dose aspirin.  Toprol for rate control.  cardiology follow up outpatient.

## 2019-01-07 NOTE — DISCHARGE NOTE ADULT - HOME CARE AGENCY
[facility] for your home care services (skilled nurse assessment & physical therapy services).   Telephone: [phone]  Requested start of care: 1/08/2019.

## 2019-01-07 NOTE — PROGRESS NOTE ADULT - ASSESSMENT
89 y/o female PMHx of dementia, HTN, osteopenia presenting to the ED at 16.41 PM BIB son c/o confusion and expressive aphasia today noted at 3.00 pm. A code stroke was called, HCT negative for acute infarct, chronic b/l BG lacunar infarct present. NIHSS -1, aphasia improved on exam.    # TIA vs acute delirium; No IV tpa given due to resolving sxs    # A. Fib    - BP elevated in /102, son states patient was previously on HCTZ, optImize BP  - Cardiac consult  - cardiac echo  - Start ASA 325mg  - Full dose Statin  - Dysphagia screen  - SQH, SCDs for DVT prophylaxis  - MRI/A brain-carotids w/o gado  - PT eval  - Speech/swallow eval 89 y/o female PMHx of dementia, HTN, osteopenia presenting to the ED at 16.41 PM BIB son c/o confusion and expressive aphasia today noted at 3.00 pm. A code stroke was called, HCT negative for acute infarct, chronic b/l BG lacunar infarct present. NIHSS -1 initially, aphasia improved in ED.    # TIA,  No IV tpa given due to resolving sxs    # A. Fib    # Uncontrolled BP in /102,     - Cardiac consult, reg A.FIB   - echo  - Start ASA 325mg/ Lipitor 40 mgs  - SQH, SCDs for DVT prophylaxis  - MRI/A brain-carotids w/o gado  - PT eval  - Speech/swallow eval    D/W PT and Dr. Damico

## 2019-01-07 NOTE — SWALLOW BEDSIDE ASSESSMENT ADULT - SLP GENERAL OBSERVATIONS
The patient was alert and interactive. She was able to verbalize during communicative probes and in conversation. Pt's verbalizations were intelligible, fluent, linguistically intact and contextually appropriate. He motor speech and language abilities are within functional parameters. The pt was able to effectively verbalize her needs and is reportedly at communicative baseline.  Note that pt denied Dysphagia. The patient was alert and interactive. She was able to verbalize during communicative probes and in conversation. Pt's verbalizations were intelligible, fluent, linguistically intact and contextually appropriate. Her motor speech and language abilities are within functional parameters. The pt was able to effectively verbalize her needs and is reportedly at communicative baseline.  Note that pt denied Dysphagia.

## 2019-01-07 NOTE — DISCHARGE NOTE ADULT - HOSPITAL COURSE
89F.  admitted 01/06/19.  presented to ED c/o confusion.  family noted gradual onset.  was unable to follow commands or answer questions appropriately.  in ED, NIHSS was 0.    PMHx:  HTN;  dementia;  osteopenia.    TIA.  -telemetry monitoring, AF 70-90.  -echocardiogram, LVEF 60-65%.  LA mildly dilated.  mod-to-sv TR.  -MR brain as well as MRA COW + neck deferred.  -c/w ASA + statin.  -speech pathology evaluation noted, regular texture diet w/ thin liquids.  -PT.  -Neurology input appreciated.    AF.  -[id number]-[id number] Score is 6.  -full anticoagulation with either VKA or DOAC deferred owing to high fall and bleeding risk.  -patient will be continued on full dose ASA as well as Toprol XL for rate control.    disposition.  -may discharge today home w/ home care.  -d/c > 35 minutes.    communication.  -RN.  -case management.  -Neurology.  -Cardiology.  -patient's son and granddaughter.

## 2019-01-07 NOTE — DIETITIAN INITIAL EVALUATION ADULT. - PERTINENT MEDS FT
MEDICATIONS  (STANDING):  aspirin enteric coated 325 milliGRAM(s) Oral daily  atorvastatin 40 milliGRAM(s) Oral at bedtime  heparin  Injectable 5000 Unit(s) SubCutaneous every 12 hours  metoprolol succinate ER 50 milliGRAM(s) Oral daily    MEDICATIONS  (PRN):

## 2019-01-07 NOTE — SWALLOW BEDSIDE ASSESSMENT ADULT - COMMENTS
The patient was admitted to  with onset of Aphasia which is improving. A TIA is suspected. Neuro is following. This profile is superimposed upon a history of osteopenia, HTN and A-Fib.

## 2019-01-07 NOTE — SWALLOW BEDSIDE ASSESSMENT ADULT - SWALLOW EVAL: CRITERIA FOR SKILLED INTERVENTION MET
ACUTE SPEECH PATHOLOGY INTERVENTION IS NOT CLINICALLY WARRANTED. PT'S SPEECH-LANGUAGE AND OROPHARYNGEAL SWALLOWING ABILITIES ARE WITHIN FUNCTIONAL PARAMETERS. GIVEN ABOVE, WILL NOT ACTIVELY FOLLOW. RECONSULT PRN.

## 2019-01-07 NOTE — DIETITIAN INITIAL EVALUATION ADULT. - PROBLEM SELECTOR PLAN 4
~cont. to monitor  ~not currently on any known home medications  ~BP Target =>  systolic blood pressure =185 mmHg and diastolic blood pressure =110 mmHg

## 2019-01-07 NOTE — SWALLOW BEDSIDE ASSESSMENT ADULT - SWALLOW EVAL: RECOMMENDED DIET
SUGGEST A REGULAR TEXTURE DIET WITH THIN LIQUID CONSISTENCIES AS SHE TOLERATES THESE FOOD TEXTURES FROM AN OROPHARYNGEAL SWALLOWING STANCE ON EXAM.

## 2019-01-07 NOTE — DIETITIAN INITIAL EVALUATION ADULT. - PERTINENT LABORATORY DATA
01-07 Na143 mmol/L Glu 106 mg/dL<H> K+ 3.7 mmol/L Cr  0.70 mg/dL BUN 15 mg/dL Phos n/a   Alb 2.9 g/dL<L> PAB n/a

## 2019-01-07 NOTE — SWALLOW BEDSIDE ASSESSMENT ADULT - SWALLOW EVAL: DIAGNOSIS
1) The pt exhibits Oropharyngeal Swallowing abilities which subjectively appear to be stable and within functional parameters for age. NO behavioral aspiration signs exhibited on exam. Odynophagia was denied.   2) The patient was alert and interactive. She was able to verbalize during communicative probes and in conversation. Pt's verbalizations were intelligible, fluent, linguistically intact and contextually appropriate. He motor speech and language abilities are within functional parameters. The pt was able to effectively verbalize her needs and is reportedly at communicative baseline. 1) The pt exhibits Oropharyngeal Swallowing abilities which subjectively appear to be stable and within functional parameters for age. NO behavioral aspiration signs exhibited on exam. Odynophagia was denied.   2) The patient was alert and interactive. She was able to verbalize during communicative probes and in conversation. Pt's verbalizations were intelligible, fluent, linguistically intact and contextually appropriate. Her motor speech and language abilities are within functional parameters. The pt was able to effectively verbalize her needs and is reportedly at communicative baseline.

## 2019-01-07 NOTE — DISCHARGE NOTE ADULT - PATIENT PORTAL LINK FT
You can access the [facility], offered by [facility], by registering with the following website: [email or web address]

## 2019-01-07 NOTE — CONSULT NOTE ADULT - SUBJECTIVE AND OBJECTIVE BOX
HPI:  89 y/o F PMHx significant for Dementia, HTN, osteopenia BIB family for further evaluation of increased confusion. As per patient's family (chart recocrds) patient was reportedly increasingly "confused" unable to follow commands, or answer questions appropriately. NIHSS was 0. Pt. now feels back to baseline. Denies chest pain, SOB, palpitations, orthopnea, or PND. Spoke with her granddaughter who reports her dementia is recent. Also she will not take medication and also is a great fall risk since the dementia has occurred     PAST MEDICAL & SURGICAL HISTORY:  History of hip replacement  History of lumpectomy  HTN    SOCIAL HISTORY: Non-Smoker/No ETOH/ No Ilicit Drug use.    FAMILY HISTORY:  Family history of early CAD (Father)    Allergies  No Known Allergies    HOSPITAL MEDICATIONS:   MEDICATIONS  (STANDING):  aspirin enteric coated 325 milliGRAM(s) Oral daily  atorvastatin 40 milliGRAM(s) Oral at bedtime  heparin  Injectable 5000 Unit(s) SubCutaneous every 12 hours    Home medication: none.    REVIEW OF SYSTEMS: 13 systems were reviewed and all negative except for comments above.    Vital Signs Last 24 Hrs  T(C): 36.3 (07 Jan 2019 11:18), Max: 37.1 (06 Jan 2019 16:41)  T(F): 97.4 (07 Jan 2019 11:18), Max: 98.7 (06 Jan 2019 16:41)  HR: 90 (07 Jan 2019 11:18) (77 - 94)  BP: 153/70 (07 Jan 2019 11:18) (153/70 - 199/119)  BP(mean): --  RR: 18 (07 Jan 2019 11:18) (14 - 18)  SpO2: 100% (07 Jan 2019 11:18) (97% - 100%)Daily     Daily I&O's Summary    PHYSICAL EXAM:    Constitutional: NAD, awake and alert, well-developed  HEENT: PERRLA, EOMI,  No oral cyanosis. Oropharynx Clean and Dry.  Neck:  supple,  No JVD, No Thyroid enlargement. No Carotid Bruits bilaterally.  Respiratory: Breath sounds are clear bilaterally, No wheezing, rales or rhonchi  Cardiovascular: NL S1 and S2, RRR, no Murmurs, gallops or rubs  Gastrointestinal: Bowel Sounds present, soft, NT, ND, No HSM.   Extremities: No peripheral edema. No clubbing or cyanosis.  Barbeau Test performed in R+L UE and Negative.  Vascular: 2+ peripheral pulses in LE   Neurological: A/O x 3, no focal motor or sensory deficits  Musculoskeletal: no calf tenderness or joint swelling.  Skin: No rashes or lessions.      LABS: All Labs Reviewed:                        11.3   6.54  )-----------( 191      ( 07 Jan 2019 05:35 )             35.4                         12.2   6.62  )-----------( 195      ( 06 Jan 2019 17:08 )             38.3     07 Jan 2019 05:35    143    |  107    |  15     ----------------------------<  106    3.7     |  29     |  0.70   06 Jan 2019 17:08    142    |  107    |  24     ----------------------------<  113    3.9     |  28     |  0.79     Ca    8.7        07 Jan 2019 05:35  Ca    9.1        06 Jan 2019 17:08    TPro  6.3    /  Alb  2.9    /  TBili  0.8    /  DBili  x      /  AST  27     /  ALT  38     /  AlkPhos  104    07 Jan 2019 05:35  TPro  7.3    /  Alb  3.5    /  TBili  0.5    /  DBili  x      /  AST  39     /  ALT  48     /  AlkPhos  122    06 Jan 2019 17:08    PT/INR - ( 06 Jan 2019 17:08 )   PT: 12.1 sec;   INR: 1.09 ratio         PTT - ( 06 Jan 2019 17:08 )  PTT:28.3 sec  CARDIAC MARKERS ( 06 Jan 2019 20:03 )  0.015 ng/mL / x     / x     / x     / x      CARDIAC MARKERS ( 06 Jan 2019 17:08 )  <0.015 ng/mL / x     / x     / x     / x            01-07 @ 05:35  TSH: 1.15      RADIOLOGY:    EKG:    ECHO:    CARDIAC CATHTERIZATION/STRESS TEST: HPI:  89 y/o F PMHx significant for Dementia, HTN, osteopenia BIB family for further evaluation of increased confusion. As per patient's family (chart recocrds) patient was reportedly increasingly "confused" unable to follow commands, or answer questions appropriately. NIHSS was 0. Pt. now feels back to baseline. Denies chest pain, SOB, palpitations, orthopnea, or PND. Spoke with her granddaughter who reports her dementia is recent. Also she will not take medication and also is a great fall risk since the dementia has occurred     PAST MEDICAL & SURGICAL HISTORY:  History of hip replacement  History of lumpectomy  HTN    SOCIAL HISTORY: Non-Smoker/No ETOH/ No Ilicit Drug use.    FAMILY HISTORY:  Family history of early CAD (Father)    Allergies  No Known Allergies    HOSPITAL MEDICATIONS:   MEDICATIONS  (STANDING):  aspirin enteric coated 325 milliGRAM(s) Oral daily  atorvastatin 40 milliGRAM(s) Oral at bedtime  heparin  Injectable 5000 Unit(s) SubCutaneous every 12 hours    Home medication: none.    REVIEW OF SYSTEMS: 13 systems were reviewed and all negative except for comments above.    Vital Signs Last 24 Hrs  T(C): 36.3 (07 Jan 2019 11:18), Max: 37.1 (06 Jan 2019 16:41)  T(F): 97.4 (07 Jan 2019 11:18), Max: 98.7 (06 Jan 2019 16:41)  HR: 90 (07 Jan 2019 11:18) (77 - 94)  BP: 153/70 (07 Jan 2019 11:18) (153/70 - 199/119)  BP(mean): --  RR: 18 (07 Jan 2019 11:18) (14 - 18)  SpO2: 100% (07 Jan 2019 11:18) (97% - 100%)Daily     Daily I&O's Summary    PHYSICAL EXAM:  Constitutional: NAD, awake and alert, well-developed  HEENT: PERRLA, EOMI,  No oral cyanosis.   Neck:  supple,  No JVD, No Thyroid enlargement. No Carotid Bruits bilaterally.  Respiratory: Breath sounds are clear bilaterally, No wheezing, rales or rhonchi  Cardiovascular: NL S1 and S2, IR, IR, 1-6 ELLI to RUSB, No S3, No s4,  Gastrointestinal: Bowel Sounds present, soft   Extremities: No peripheral edema. No clubbing or cyanosis.   Vascular: 1+ peripheral pulses in LE   Neurological: no gross focal motor deficits. Gait not tested.  Musculoskeletal: no calf tenderness.  Skin: No rashes.      LABS: All Labs Reviewed:                        11.3   6.54  )-----------( 191      ( 07 Jan 2019 05:35 )             35.4                         12.2   6.62  )-----------( 195      ( 06 Jan 2019 17:08 )             38.3     07 Jan 2019 05:35    143    |  107    |  15     ----------------------------<  106    3.7     |  29     |  0.70   06 Jan 2019 17:08    142    |  107    |  24     ----------------------------<  113    3.9     |  28     |  0.79     Ca    8.7        07 Jan 2019 05:35  Ca    9.1        06 Jan 2019 17:08    TPro  6.3    /  Alb  2.9    /  TBili  0.8    /  DBili  x      /  AST  27     /  ALT  38     /  AlkPhos  104    07 Jan 2019 05:35  TPro  7.3    /  Alb  3.5    /  TBili  0.5    /  DBili  x      /  AST  39     /  ALT  48     /  AlkPhos  122    06 Jan 2019 17:08    PT/INR - ( 06 Jan 2019 17:08 )   PT: 12.1 sec;   INR: 1.09 ratio         PTT - ( 06 Jan 2019 17:08 )  PTT:28.3 sec  CARDIAC MARKERS ( 06 Jan 2019 20:03 )  0.015 ng/mL / x     / x     / x     / x      CARDIAC MARKERS ( 06 Jan 2019 17:08 )  <0.015 ng/mL / x     / x     / x     / x        01-07 @ 05:35  TSH: 1.15    RADIOLOGY:  < from: CT Brain Stroke Protocol (01.06.19 @ 16:59) >  FINDINGS:     The ventricles and sulci are prominent, compatible with age-related   generalized cerebral volume loss.   There is no CT evidence for acute   cerebral cortical infarct.There is no evidence of hemorrhage. Chronic   lacunar infarcts in bilateral basal ganglia are present. There is   periventricular and subcortical white matter hypoattenuation,  most   compatible with severe chronic microvascular ischemic changes.   No mass   effect is found in the brain.  There is no midline shift or herniation   pattern.      The visualized portions of the paranasal sinuses and mastoid air cells   are clear.      IMPRESSION:       No evidence of acute intracranial abnormality.  No evidence of   hemorrhage.      Chronic changes as above.      < end of copied text >    EKG:  < from: 12 Lead ECG (01.06.19 @ 17:07) >  Atrial fibrillation  Non-specific change in ST segment in Lateral leads  T wave inversion now evident in Inferior leads  Nonspecific T wave abnormality now evident in Lateral leads  < end of copied text >    ECHO:  1/7/19 Echo Prelim: NL LV FX, Moderate TR, Mild AI, Mild MR

## 2019-01-07 NOTE — PROGRESS NOTE ADULT - SUBJECTIVE AND OBJECTIVE BOX
Pt    ROS: Pertinent positives as above, all other ROS were reviewed and are negative.     MEDICATIONS  (STANDING):  aspirin enteric coated 325 milliGRAM(s) Oral daily  heparin  Injectable 5000 Unit(s) SubCutaneous every 12 hours      Vital Signs Last 24 Hrs  T(C): 36.8 (07 Jan 2019 04:20), Max: 37.1 (06 Jan 2019 16:41)  T(F): 98.3 (07 Jan 2019 04:20), Max: 98.7 (06 Jan 2019 16:41)  HR: 83 (07 Jan 2019 04:20) (77 - 94)  BP: 155/88 (07 Jan 2019 04:20) (155/88 - 199/119)  BP(mean): --  RR: 14 (07 Jan 2019 04:20) (14 - 18)  SpO2: 97% (07 Jan 2019 04:20) (97% - 100%)    Neurological exam:  HF: A x O x 2 to person, place, year states it is december,  Speech fluent, No Aphasia or paraphasic errors. Naming /repetition intact   CN: THAD, EOMI, VFF, facial sensation normal, no NLFD, tongue midline, Palate moves equally, SCM equal bilaterally  Motor: No pronator drift, Strength 5/5 in all 4 ext, normal bulk and tone, no tremor, rigidity or bradykinesia.    Sens: Intact to light touch   Reflexes: Symmetric and normal . BJ 2+, BR 2+, KJ 2+, AJ 2+, downgoing toes b/l  Coord:  No FNFA, dysmetria, JULIANE intact   Gait/Balance: Cannot test                        11.3   6.54  )-----------( 191      ( 07 Jan 2019 05:35 )             35.4     01-07    143  |  107  |  15  ----------------------------<  106<H>  3.7   |  29  |  0.70    Ca    8.7      07 Jan 2019 05:35    TPro  6.3  /  Alb  2.9<L>  /  TBili  0.8  /  DBili  x   /  AST  27  /  ALT  38  /  AlkPhos  104  01-07    01-07 [id number] 6.3    01-07 Chol 176  HDL 62 Trig 51    Radiology report:  - CT Head  - MRI brain  - MRA brain/carotids  - EEG Pt has noted total resolution of aphasia, has no complaints, converses well, able to give history.  Has A. fIb, is on ASA, awaits cardio-input and MRI/A brain    ROS: Pertinent positives as above, all other ROS were reviewed and are negative.     MEDICATIONS  (STANDING):  aspirin enteric coated 325 milliGRAM(s) Oral daily  heparin  Injectable 5000 Unit(s) SubCutaneous every 12 hours      Vital Signs Last 24 Hrs  T(C): 36.8 (07 Jan 2019 04:20), Max: 37.1 (06 Jan 2019 16:41)  T(F): 98.3 (07 Jan 2019 04:20), Max: 98.7 (06 Jan 2019 16:41)  HR: 83 (07 Jan 2019 04:20) (77 - 94)  BP: 155/88 (07 Jan 2019 04:20) (155/88 - 199/119)  BP(mean): --  RR: 14 (07 Jan 2019 04:20) (14 - 18)  SpO2: 97% (07 Jan 2019 04:20) (97% - 100%)    Neurological exam:  HF: A x O x  person, place, year, Speech fluent, No Aphasia or paraphasic errors. Naming /repetition intact   CN: THAD, EOMI, VFF, facial sensation normal, no NLFD, tongue midline, Palate moves equally, SCM equal bilaterally  Motor: No pronator drift, Strength 5/5 in all 4 ext, normal bulk and tone, no tremor, rigidity or bradykinesia.    Sens: Intact to light touch   Reflexes: Symmetric and normal . BJ 2+, BR 2+, KJ 2+, AJ 2+, downgoing toes b/l  Coord:  No FNFA, dysmetria, JULIANE intact   Gait/Balance: Cannot test    NIHSS 1                        11.3   6.54  )-----------( 191      ( 07 Jan 2019 05:35 )             35.4     01-07    143  |  107  |  15  ----------------------------<  106<H>  3.7   |  29  |  0.70    Ca    8.7      07 Jan 2019 05:35    TPro  6.3  /  Alb  2.9<L>  /  TBili  0.8  /  DBili  x   /  AST  27  /  ALT  38  /  AlkPhos  104  01-07    01-07 [id number] 6.3    01-07 Chol 176  HDL 62 Trig 51    Radiology report:  - CT Head: < from: CT Brain Stroke Protocol (01.06.19 @ 16:59) >  IMPRESSION:       No evidence of acute intracranial abnormality.  No evidence of   hemorrhage.      Chronic changes as above.

## 2019-01-07 NOTE — DIETITIAN INITIAL EVALUATION ADULT. - PROBLEM SELECTOR PLAN 5
~as per patient's son at the bedside the patient's best known wishes concerning code status were to be DNR/DNI. MOLST forms as well as capacity were signed per protocol.  Total time; 16minutes

## 2019-01-07 NOTE — DIETITIAN INITIAL EVALUATION ADULT. - ENERGY NEEDS
Ht.    65  "        Wt.   64     kg               BMI 23                 IBW  59     kg               Pt is at  108   %  IBW

## 2019-01-15 DIAGNOSIS — I48.91 UNSPECIFIED ATRIAL FIBRILLATION: ICD-10-CM

## 2019-01-15 DIAGNOSIS — E43 UNSPECIFIED SEVERE PROTEIN-CALORIE MALNUTRITION: ICD-10-CM

## 2019-01-15 DIAGNOSIS — M85.80 OTHER SPECIFIED DISORDERS OF BONE DENSITY AND STRUCTURE, UNSPECIFIED SITE: ICD-10-CM

## 2019-01-15 DIAGNOSIS — G20 PARKINSON'S DISEASE: ICD-10-CM

## 2019-01-15 DIAGNOSIS — Z96.649 PRESENCE OF UNSPECIFIED ARTIFICIAL HIP JOINT: ICD-10-CM

## 2019-01-15 DIAGNOSIS — I10 ESSENTIAL (PRIMARY) HYPERTENSION: ICD-10-CM

## 2019-01-15 DIAGNOSIS — F02.80 DEMENTIA IN OTHER DISEASES CLASSIFIED ELSEWHERE, UNSPECIFIED SEVERITY, WITHOUT BEHAVIORAL DISTURBANCE, PSYCHOTIC DISTURBANCE, MOOD DISTURBANCE, AND ANXIETY: ICD-10-CM

## 2019-01-15 DIAGNOSIS — G45.9 TRANSIENT CEREBRAL ISCHEMIC ATTACK, UNSPECIFIED: ICD-10-CM

## 2019-01-15 DIAGNOSIS — R41.0 DISORIENTATION, UNSPECIFIED: ICD-10-CM

## 2019-03-21 ENCOUNTER — INPATIENT (INPATIENT)
Facility: HOSPITAL | Age: 84
LOS: 1 days | Discharge: HOME | End: 2019-03-23
Attending: HOSPITALIST | Admitting: HOSPITALIST
Payer: MEDICARE

## 2019-03-21 VITALS
DIASTOLIC BLOOD PRESSURE: 83 MMHG | SYSTOLIC BLOOD PRESSURE: 171 MMHG | RESPIRATION RATE: 18 BRPM | HEART RATE: 81 BPM | TEMPERATURE: 98 F | OXYGEN SATURATION: 100 %

## 2019-03-21 DIAGNOSIS — Z96.649 PRESENCE OF UNSPECIFIED ARTIFICIAL HIP JOINT: Chronic | ICD-10-CM

## 2019-03-21 DIAGNOSIS — Z98.890 OTHER SPECIFIED POSTPROCEDURAL STATES: Chronic | ICD-10-CM

## 2019-03-21 LAB
ALBUMIN SERPL ELPH-MCNC: 3.4 G/DL — SIGNIFICANT CHANGE UP (ref 3.3–5)
ALP SERPL-CCNC: 167 U/L — HIGH (ref 40–120)
ALT FLD-CCNC: 65 U/L — SIGNIFICANT CHANGE UP (ref 12–78)
ANION GAP SERPL CALC-SCNC: 7 MMOL/L — SIGNIFICANT CHANGE UP (ref 5–17)
APTT BLD: 28.9 SEC — SIGNIFICANT CHANGE UP (ref 27.5–36.3)
AST SERPL-CCNC: 46 U/L — HIGH (ref 15–37)
BASOPHILS # BLD AUTO: 0.06 K/UL — SIGNIFICANT CHANGE UP (ref 0–0.2)
BASOPHILS NFR BLD AUTO: 0.7 % — SIGNIFICANT CHANGE UP (ref 0–2)
BILIRUB SERPL-MCNC: 1.2 MG/DL — SIGNIFICANT CHANGE UP (ref 0.2–1.2)
BUN SERPL-MCNC: 17 MG/DL — SIGNIFICANT CHANGE UP (ref 7–23)
CALCIUM SERPL-MCNC: 8.7 MG/DL — SIGNIFICANT CHANGE UP (ref 8.5–10.1)
CHLORIDE SERPL-SCNC: 106 MMOL/L — SIGNIFICANT CHANGE UP (ref 96–108)
CO2 SERPL-SCNC: 27 MMOL/L — SIGNIFICANT CHANGE UP (ref 22–31)
CREAT SERPL-MCNC: 0.69 MG/DL — SIGNIFICANT CHANGE UP (ref 0.5–1.3)
EOSINOPHIL # BLD AUTO: 0.04 K/UL — SIGNIFICANT CHANGE UP (ref 0–0.5)
EOSINOPHIL NFR BLD AUTO: 0.4 % — SIGNIFICANT CHANGE UP (ref 0–6)
GLUCOSE SERPL-MCNC: 108 MG/DL — HIGH (ref 70–99)
HCT VFR BLD CALC: 36.3 % — SIGNIFICANT CHANGE UP (ref 34.5–45)
HGB BLD-MCNC: 11.4 G/DL — LOW (ref 11.5–15.5)
IMM GRANULOCYTES NFR BLD AUTO: 0.5 % — SIGNIFICANT CHANGE UP (ref 0–1.5)
INR BLD: 1.19 RATIO — HIGH (ref 0.88–1.16)
LYMPHOCYTES # BLD AUTO: 0.98 K/UL — LOW (ref 1–3.3)
LYMPHOCYTES # BLD AUTO: 10.7 % — LOW (ref 13–44)
MCHC RBC-ENTMCNC: 30.8 PG — SIGNIFICANT CHANGE UP (ref 27–34)
MCHC RBC-ENTMCNC: 31.4 GM/DL — LOW (ref 32–36)
MCV RBC AUTO: 98.1 FL — SIGNIFICANT CHANGE UP (ref 80–100)
MONOCYTES # BLD AUTO: 0.9 K/UL — SIGNIFICANT CHANGE UP (ref 0–0.9)
MONOCYTES NFR BLD AUTO: 9.8 % — SIGNIFICANT CHANGE UP (ref 2–14)
NEUTROPHILS # BLD AUTO: 7.12 K/UL — SIGNIFICANT CHANGE UP (ref 1.8–7.4)
NEUTROPHILS NFR BLD AUTO: 77.9 % — HIGH (ref 43–77)
NRBC # BLD: 0 /100 WBCS — SIGNIFICANT CHANGE UP (ref 0–0)
PLATELET # BLD AUTO: 189 K/UL — SIGNIFICANT CHANGE UP (ref 150–400)
POTASSIUM SERPL-MCNC: 3.6 MMOL/L — SIGNIFICANT CHANGE UP (ref 3.5–5.3)
POTASSIUM SERPL-SCNC: 3.6 MMOL/L — SIGNIFICANT CHANGE UP (ref 3.5–5.3)
PROT SERPL-MCNC: 6.7 GM/DL — SIGNIFICANT CHANGE UP (ref 6–8.3)
PROTHROM AB SERPL-ACNC: 13.3 SEC — HIGH (ref 10–12.9)
RBC # BLD: 3.7 M/UL — LOW (ref 3.8–5.2)
RBC # FLD: 15 % — HIGH (ref 10.3–14.5)
SODIUM SERPL-SCNC: 140 MMOL/L — SIGNIFICANT CHANGE UP (ref 135–145)
WBC # BLD: 9.15 K/UL — SIGNIFICANT CHANGE UP (ref 3.8–10.5)
WBC # FLD AUTO: 9.15 K/UL — SIGNIFICANT CHANGE UP (ref 3.8–10.5)

## 2019-03-21 PROCEDURE — 93010 ELECTROCARDIOGRAM REPORT: CPT

## 2019-03-21 PROCEDURE — 70450 CT HEAD/BRAIN W/O DYE: CPT | Mod: 26,59

## 2019-03-21 PROCEDURE — 70496 CT ANGIOGRAPHY HEAD: CPT | Mod: 26

## 2019-03-21 PROCEDURE — 99285 EMERGENCY DEPT VISIT HI MDM: CPT

## 2019-03-21 PROCEDURE — 70498 CT ANGIOGRAPHY NECK: CPT | Mod: 26

## 2019-03-21 RX ORDER — ATORVASTATIN CALCIUM 80 MG/1
40 TABLET, FILM COATED ORAL AT BEDTIME
Qty: 0 | Refills: 0 | Status: DISCONTINUED | OUTPATIENT
Start: 2019-03-21 | End: 2019-03-23

## 2019-03-21 RX ORDER — ASPIRIN/CALCIUM CARB/MAGNESIUM 324 MG
325 TABLET ORAL DAILY
Qty: 0 | Refills: 0 | Status: DISCONTINUED | OUTPATIENT
Start: 2019-03-21 | End: 2019-03-22

## 2019-03-21 RX ADMIN — ATORVASTATIN CALCIUM 40 MILLIGRAM(S): 80 TABLET, FILM COATED ORAL at 22:09

## 2019-03-21 NOTE — ED ADULT NURSE REASSESSMENT NOTE - NS ED NURSE REASSESS COMMENT FT1
pt received from previous RN. denies pain. AOX2. vss. left sided weakness. medicated as ordered. POC reviewed with pt. awaiting bed placement. will continue to monitor pt received from previous RN. denies pain. AOX2. vss. left sided weakness, unchanged. medicated as ordered. POC reviewed with pt. awaiting bed placement. will continue to monitor

## 2019-03-21 NOTE — CONSULT NOTE ADULT - SUBJECTIVE AND OBJECTIVE BOX
Patient is a 89y old  Female who presents with a chief complaint of Left sided weakness    HPI: Pt is a 89 year old woman with a PMH of HTN, TIA, and mild dementia who presented to the ED with her son and granddaughter c/o left sided weakness.  As per the son, the patient was found on the floor next to her bed early this am, she was unable to ambulate at that time, her family helped her to the bathroom and then put her back to bed. The patient was again found on the floor next to her bed at 3:30pm. She was found to have weakness in her left arm and leg. Her family gave her 325mg of Aspirin.  The patient presented to the ED at 6:28pm and was taken directly to CT as a CODE STROKE. The patient has a NIHSS of 5, for left arm weakness, left leg drift and two extremity ataxia.  She is not a candidate for t-PA as she is outside the time window.     PAST MEDICAL & SURGICAL HISTORY:  HTN (hypertension)  TIA January 2019  Osteopenia  Dementia- Mild  History of hip replacement  History of lumpectomy    FAMILY HISTORY:  Family history of early CAD (Father)     Social Hx:  Nonsmoker, no drug or alcohol use    MEDICATIONS  (STANDING):  Home Meds Reviewed  Pt on aspirin took aspirin 325mg at 4pm     Allergies  No Known Allergies    ROS: Pertinent positives in HPI, all other ROS were reviewed and are negative.      Vital Signs Last 24 Hrs  T(C): 36.7 (21 Mar 2019 18:27), Max: 36.7 (21 Mar 2019 18:27)  T(F): 98.1 (21 Mar 2019 18:27), Max: 98.1 (21 Mar 2019 18:27)  HR: 81 (21 Mar 2019 18:27) (81 - 81)  BP: 171/83 (21 Mar 2019 18:27) (171/83 - 171/83)  RR: 18 (21 Mar 2019 18:27) (18 - 18)  SpO2: 100% (21 Mar 2019 18:27) (100% - 100%)    PHYSICAL EXAM:  Constitutional: awake and alert, NAD  HEENT: PERRLA, EOMI  Neck: Supple  Respiratory: Breath sounds are clear bilaterally  Cardiovascular: S1 and S2, regular rhythm  Gastrointestinal: soft, nontender  Extremities:  no edema  Vascular: Carotid Bruit - no  Musculoskeletal: no joint swelling/tenderness, no abnormal movements  Skin: No rashes    Neurological exam:  HF: A x O x 3. Appropriately interactive, normal affect. Speech fluent, No Aphasia or paraphasic errors. Naming/repetition intact   CN: PEARRL, EOMI, VFF, facial sensation normal, no NLFD, tongue midline, Palate moves equally, SCM equal bilaterally  Motor: Right upper and lower 5/5, LUE 3/5, LLE 4/5  Sens: Intact to light touch    Reflexes: depressed throughout, downgoing toes b/l  Coord:  dysmetria finger to nose and heal/shin on LEFT   Gait/Balance: Not tested     NIHSS:  5    Labs:                        11.4   9.15  )-----------( 189      ( 21 Mar 2019 18:42 )             36.3       RADIOLOGY:  < from: CT Brain Stroke Protocol (01.06.19 @ 16:59) >  The ventricles and sulci are prominent, compatible with age-related   generalized cerebral volume loss.   There is no CT evidence for acute   cerebral cortical infarct.There is no evidence of hemorrhage. Chronic   lacunar infarcts in bilateral basal ganglia are present. There is   periventricular and subcortical white matter hypoattenuation,  most   compatible with severe chronic microvascular ischemic changes.   No mass   effect is found in the brain.  There is no midline shift or herniation   pattern.    The visualized portions of the paranasal sinuses and mastoid air cells   are clear.      IMPRESSION:     No evidence of acute intracranial abnormality.  No evidence of   hemorrhage.    Chronic changes as above.

## 2019-03-21 NOTE — H&P ADULT - HISTORY OF PRESENT ILLNESS
89 y/o female with h/o afib on asa, htn, hld was biba due to left sided weakness.  patient states she slid off the bed and was unable to get up until her son found her on the floor.  denies LOC, head injury.  son states she was unable to hold her cane or walk properly due to weakness in her arm and leg.  associated with slurred speech.  as per family at bedside, patient symptoms had improved from arrival.      as per chart, patient had a similar episode in 1/2019, at that time it was discussed with family to have patient on aspirin only for afib.      In ED, cth-no acute changes. no TPA given absed on unknown onset of symptoms.

## 2019-03-21 NOTE — ED PROVIDER NOTE - CLINICAL SUMMARY MEDICAL DECISION MAKING FREE TEXT BOX
Plan with followup CT, labs, discuss with neurology. Not candidate of TPA given unclear onset of symptoms.

## 2019-03-21 NOTE — H&P ADULT - ASSESSMENT
89 y/o female    left sided arm/leg weakness with dysphagia, improved. secondary to tia vs cva, likely embolic with h/o afib not on AC,  -admit to tele   -neurocheck q4h   -neuro consult   -cardio consult   -strict i/o   -cont asa/statin for now      afib, rate controlled.  chads-vasc: 6  -hold BB, allow for permissive bp for 24hrs, then SBP<140  -cont asa for now, will need to discuss need for possible AC    dvt prophylaxis   -ipc bilaterally     advance directives   -dnr/dni, molst form in chart

## 2019-03-21 NOTE — ED ADULT TRIAGE NOTE - CHIEF COMPLAINT QUOTE
Slurred speech, left arm weakness Slurred speech, left arm weakness approximately 90 minutes prior to arrival.

## 2019-03-21 NOTE — ED ADULT NURSE NOTE - OBJECTIVE STATEMENT
Pt alert and oriented x1-2. Pt forgetful. Pt brought in by family for L sided weakness that began this am. Pt family states pt was more weak today and has not been getting up and noticed the L sided weakness. Pt also is having slurred speech at this time. L sided drift noted. Denies chest pain SOB or dizziness. Denies numbness tingling headache. Pt hx of TIA and afib.

## 2019-03-21 NOTE — ED PROVIDER NOTE - OBJECTIVE STATEMENT
89 y/o female with a PMHx of HTN, Dementia, osteopenia, TIA (2 months ago, January) presents to the ED BIBA s/p unwitnessed fall out of bed this morning c/o intermittent episodes of left upper and lower extremity weakness since this morning. This morning pt was found on the floor out of bed. Pt had left upper and lower extremity weakness. Pt was put back into bed and became asymptomatic. 90 minutes PTA to ED pt had an episode of slurred speech and upper and lower extremity weakness. In ED, family states the symptoms have improved, but are still present.

## 2019-03-21 NOTE — ED ADULT NURSE REASSESSMENT NOTE - NS ED NURSE REASSESS COMMENT FT1
rounded on pt. resting comfortably in stretcher. vss. awaiting bed placement. will continue to monitor

## 2019-03-21 NOTE — ED PROVIDER NOTE - PROGRESS NOTE DETAILS
Tera ZARCO for Dr. Dwyer: Discussed with Dr. Cesar. Given unclear onset of symptoms and episode of similar weakness this morning. Will not proceed with TPA. Obtain CTA angio and admit here provided no vessel occlusion. Tera ZARCO for Dr. Dwyer: Reviewed plan with neuro PA. Tera ZARCO for Dr. Dwyer: Discussed with pt and family. Would prefer not to have CT angio and would not consent for thrombectomy. Would prefer more conservative management with admission, monitoring and assessment for anticoagulation.

## 2019-03-21 NOTE — H&P ADULT - NSHPPHYSICALEXAM_GEN_ALL_CORE
PHYSICAL EXAM:    GENERAL: NAD, Alert & Oriented X3, frail  HEENT: dry mucous membranes  HEAD:  Atraumatic, Normocephalic  EYES: EOMI, PERRLA, Conjunctiva and sclera clear  NECK: Supple, No JVD, No lymphadenopathy  CHEST/LUNG: Clear to auscultation bilaterally; No rales, rhonchi, wheezing, or rubs  HEART: irregular rate and rhythm; + systolic murmur.  no rubs, or gallops  ABDOMEN: Soft, Non-tender, Non-distended; Bowel sounds present  GENITOURINARY- Voiding, No palpable bladder  EXTREMITIES:  2+ Peripheral Pulses, No clubbing, cyanosis, or edema  MUSCULOSKELTAL- No muscle tenderness, Muscle tone normal, No joint tenderness, no Joint swelling, FROM  SKIN: No rash, No lesion  NEURO: CN II-XII intact, Motor Strength- 3/5 LEFT upper and lower extremity; 5/5 in RIGHT upper/lower extremity.  +protonator drift. DTRs 2+ intact and symmetric

## 2019-03-22 DIAGNOSIS — I63.9 CEREBRAL INFARCTION, UNSPECIFIED: ICD-10-CM

## 2019-03-22 DIAGNOSIS — J90 PLEURAL EFFUSION, NOT ELSEWHERE CLASSIFIED: ICD-10-CM

## 2019-03-22 PROBLEM — M85.80 OTHER SPECIFIED DISORDERS OF BONE DENSITY AND STRUCTURE, UNSPECIFIED SITE: Chronic | Status: ACTIVE | Noted: 2019-01-17

## 2019-03-22 PROBLEM — F03.90 UNSPECIFIED DEMENTIA WITHOUT BEHAVIORAL DISTURBANCE: Chronic | Status: ACTIVE | Noted: 2019-01-17

## 2019-03-22 PROBLEM — I10 ESSENTIAL (PRIMARY) HYPERTENSION: Chronic | Status: ACTIVE | Noted: 2019-01-17

## 2019-03-22 LAB
ALBUMIN SERPL ELPH-MCNC: 3.2 G/DL — LOW (ref 3.3–5)
ALP SERPL-CCNC: 162 U/L — HIGH (ref 40–120)
ALT FLD-CCNC: 65 U/L — SIGNIFICANT CHANGE UP (ref 12–78)
ANION GAP SERPL CALC-SCNC: 7 MMOL/L — SIGNIFICANT CHANGE UP (ref 5–17)
AST SERPL-CCNC: 43 U/L — HIGH (ref 15–37)
BASOPHILS # BLD AUTO: 0.08 K/UL — SIGNIFICANT CHANGE UP (ref 0–0.2)
BASOPHILS NFR BLD AUTO: 1.1 % — SIGNIFICANT CHANGE UP (ref 0–2)
BILIRUB SERPL-MCNC: 1.2 MG/DL — SIGNIFICANT CHANGE UP (ref 0.2–1.2)
BUN SERPL-MCNC: 16 MG/DL — SIGNIFICANT CHANGE UP (ref 7–23)
CALCIUM SERPL-MCNC: 8.8 MG/DL — SIGNIFICANT CHANGE UP (ref 8.5–10.1)
CHLORIDE SERPL-SCNC: 108 MMOL/L — SIGNIFICANT CHANGE UP (ref 96–108)
CO2 SERPL-SCNC: 30 MMOL/L — SIGNIFICANT CHANGE UP (ref 22–31)
CREAT SERPL-MCNC: 0.67 MG/DL — SIGNIFICANT CHANGE UP (ref 0.5–1.3)
EOSINOPHIL # BLD AUTO: 0.11 K/UL — SIGNIFICANT CHANGE UP (ref 0–0.5)
EOSINOPHIL NFR BLD AUTO: 1.6 % — SIGNIFICANT CHANGE UP (ref 0–6)
GLUCOSE SERPL-MCNC: 106 MG/DL — HIGH (ref 70–99)
HCT VFR BLD CALC: 37.3 % — SIGNIFICANT CHANGE UP (ref 34.5–45)
HGB BLD-MCNC: 11.9 G/DL — SIGNIFICANT CHANGE UP (ref 11.5–15.5)
IMM GRANULOCYTES NFR BLD AUTO: 0.4 % — SIGNIFICANT CHANGE UP (ref 0–1.5)
LYMPHOCYTES # BLD AUTO: 1.37 K/UL — SIGNIFICANT CHANGE UP (ref 1–3.3)
LYMPHOCYTES # BLD AUTO: 19.7 % — SIGNIFICANT CHANGE UP (ref 13–44)
MAGNESIUM SERPL-MCNC: 1.9 MG/DL — SIGNIFICANT CHANGE UP (ref 1.6–2.6)
MCHC RBC-ENTMCNC: 31.2 PG — SIGNIFICANT CHANGE UP (ref 27–34)
MCHC RBC-ENTMCNC: 31.9 GM/DL — LOW (ref 32–36)
MCV RBC AUTO: 97.9 FL — SIGNIFICANT CHANGE UP (ref 80–100)
MONOCYTES # BLD AUTO: 0.77 K/UL — SIGNIFICANT CHANGE UP (ref 0–0.9)
MONOCYTES NFR BLD AUTO: 11.1 % — SIGNIFICANT CHANGE UP (ref 2–14)
NEUTROPHILS # BLD AUTO: 4.6 K/UL — SIGNIFICANT CHANGE UP (ref 1.8–7.4)
NEUTROPHILS NFR BLD AUTO: 66.1 % — SIGNIFICANT CHANGE UP (ref 43–77)
NRBC # BLD: 0 /100 WBCS — SIGNIFICANT CHANGE UP (ref 0–0)
PHOSPHATE SERPL-MCNC: 3.2 MG/DL — SIGNIFICANT CHANGE UP (ref 2.5–4.5)
PLATELET # BLD AUTO: 191 K/UL — SIGNIFICANT CHANGE UP (ref 150–400)
POTASSIUM SERPL-MCNC: 3.6 MMOL/L — SIGNIFICANT CHANGE UP (ref 3.5–5.3)
POTASSIUM SERPL-SCNC: 3.6 MMOL/L — SIGNIFICANT CHANGE UP (ref 3.5–5.3)
PROT SERPL-MCNC: 6.5 GM/DL — SIGNIFICANT CHANGE UP (ref 6–8.3)
RBC # BLD: 3.81 M/UL — SIGNIFICANT CHANGE UP (ref 3.8–5.2)
RBC # FLD: 15.4 % — HIGH (ref 10.3–14.5)
SODIUM SERPL-SCNC: 145 MMOL/L — SIGNIFICANT CHANGE UP (ref 135–145)
WBC # BLD: 6.96 K/UL — SIGNIFICANT CHANGE UP (ref 3.8–10.5)
WBC # FLD AUTO: 6.96 K/UL — SIGNIFICANT CHANGE UP (ref 3.8–10.5)

## 2019-03-22 PROCEDURE — 70450 CT HEAD/BRAIN W/O DYE: CPT | Mod: 26

## 2019-03-22 PROCEDURE — 99223 1ST HOSP IP/OBS HIGH 75: CPT

## 2019-03-22 PROCEDURE — 99233 SBSQ HOSP IP/OBS HIGH 50: CPT

## 2019-03-22 RX ORDER — RIVAROXABAN 15 MG-20MG
15 KIT ORAL EVERY 24 HOURS
Qty: 0 | Refills: 0 | Status: DISCONTINUED | OUTPATIENT
Start: 2019-03-22 | End: 2019-03-23

## 2019-03-22 RX ORDER — ACETAMINOPHEN 500 MG
650 TABLET ORAL EVERY 6 HOURS
Qty: 0 | Refills: 0 | Status: DISCONTINUED | OUTPATIENT
Start: 2019-03-22 | End: 2019-03-23

## 2019-03-22 RX ORDER — ASPIRIN/CALCIUM CARB/MAGNESIUM 324 MG
81 TABLET ORAL DAILY
Qty: 0 | Refills: 0 | Status: DISCONTINUED | OUTPATIENT
Start: 2019-03-23 | End: 2019-03-23

## 2019-03-22 RX ORDER — METOPROLOL TARTRATE 50 MG
25 TABLET ORAL
Qty: 0 | Refills: 0 | Status: DISCONTINUED | OUTPATIENT
Start: 2019-03-22 | End: 2019-03-23

## 2019-03-22 RX ADMIN — Medication 25 MILLIGRAM(S): at 11:30

## 2019-03-22 RX ADMIN — Medication 25 MILLIGRAM(S): at 20:55

## 2019-03-22 RX ADMIN — Medication 325 MILLIGRAM(S): at 11:30

## 2019-03-22 RX ADMIN — RIVAROXABAN 15 MILLIGRAM(S): KIT at 20:55

## 2019-03-22 RX ADMIN — ATORVASTATIN CALCIUM 40 MILLIGRAM(S): 80 TABLET, FILM COATED ORAL at 20:57

## 2019-03-22 NOTE — CONSULT NOTE ADULT - SUBJECTIVE AND OBJECTIVE BOX
HPI:  89 y/o F PMHx significant for Dementia, HTN, osteopenia, persitent atrial fibrillation on asa(previous TIA but declined AC due to fall risk), htn, hld was biba due to left sided weakness.  She slid off the bed and was unable to get up until her son found her on the floor.  The pt. denies LOC, head injury.  son stated she was unable to hold her cane or walk properly due to weakness in her arm and leg and also had associated slurred speech.  In ED, cth-no acute changes. no TPA given.  Now symptoms much better.      PAST MEDICAL & SURGICAL HISTORY:  History of hip replacement  History of lumpectomy  HTN  Persistent Atrial fibrillation    SOCIAL HISTORY: Non-Smoker/No ETOH/ No Ilicit Drug use.    FAMILY HISTORY:  Family history of early CAD (Father)    Allergies  No Known Allergies    Home Medications:  aspirin 325 mg oral delayed release tablet: 1 tab(s) orally once a day (07 Jan 2019 14:32)    MEDICATIONS  (STANDING):  aspirin enteric coated 325 milliGRAM(s) Oral daily  atorvastatin 40 milliGRAM(s) Oral at bedtime  metoprolol tartrate 25 milliGRAM(s) Oral two times a day    MEDICATIONS  (PRN):  acetaminophen   Tablet .. 650 milliGRAM(s) Oral every 6 hours PRN Mild Pain (1 - 3)      Vital Signs Last 24 Hrs  T(C): 37.2 (22 Mar 2019 07:01), Max: 37.2 (22 Mar 2019 05:37)  T(F): 99 (22 Mar 2019 07:01), Max: 99 (22 Mar 2019 07:01)  HR: 74 (22 Mar 2019 05:37) (55 - 81)  BP: 176/93 (22 Mar 2019 07:01) (132/66 - 176/93)  BP(mean): --  RR: 17 (22 Mar 2019 07:01) (17 - 18)  SpO2: 99% (22 Mar 2019 07:01) (90% - 100%)    I&O's Detail    Daily Height in cm: 160.02 (21 Mar 2019 20:08)    Daily     PHYSICAL EXAM:  Constitutional: NAD, awake and alert, well-developed  HEENT: PERRLA, EOMI,  No oral cyanosis.   Neck:  supple,  No JVD, No Thyroid enlargement. No Carotid Bruits bilaterally.  Respiratory: Breath sounds are clear bilaterally, No wheezing, rales or rhonchi  Cardiovascular: NL S1 and S2, IR, IR, 1-6 ELLI to RUSB, No S3, No s4,  Gastrointestinal: Bowel Sounds present, soft   Extremities: No peripheral edema. No clubbing or cyanosis.   Vascular: 1+ peripheral pulses in LE   Neurological: CN II-XII intact, Motor Strength- 3-4/5 LEFT upper and lower extremity; 5/5 in RIGHT upper/lower extremity.  +protonator drift. DTRs 2+ intact and symmetric. Gait not tested.  Musculoskeletal: no calf tenderness.  Skin: No rashes.      LABS: All Labs Reviewed:                        11.9   6.96  )-----------( 191      ( 22 Mar 2019 05:51 )             37.3     03-22    145  |  108  |  16  ----------------------------<  106<H>  3.6   |  30  |  0.67    Ca    8.8      22 Mar 2019 05:51  Phos  3.2     03-22  Mg     1.9     03-22    TPro  6.5  /  Alb  3.2<L>  /  TBili  1.2  /  DBili  x   /  AST  43<H>  /  ALT  65  /  AlkPhos  162<H>  03-22        LIVER FUNCTIONS - ( 22 Mar 2019 05:51 )  Alb: 3.2 g/dL / Pro: 6.5 gm/dL / ALK PHOS: 162 U/L / ALT: 65 U/L / AST: 43 U/L / GGT: x           PT/INR - ( 21 Mar 2019 18:42 )   PT: 13.3 sec;   INR: 1.19 ratio         PTT - ( 21 Mar 2019 18:42 )  PTT:28.9 sec    EKG: Atrial Fib with mild VR.    < from: Transthoracic Echocardiogram (01.07.19 @ 09:13) >   Fibrocalcific changes noted to the mitral valve leaflets with preserved   leaflet excursion.   Mild mitral annular calcification is present.   Moderate (2+) mitral regurgitation is present.   Fibrocalcific changes noted to the Aortic valve leaflets with preserved   leaflet excursion.   Mild (1+) aortic regurgitation is present.   Moderate to severe (3+) tricuspid valve regurgitation is present.   Normal appearing pulmonic valve structure and function.   Mild pulmonic valvular regurgitation (1+) is present.   The left atrium is mildly dilated.   The left ventricle is normal in size, wall thickness, wall motion and   contractility.   Estimated left ventricular ejection fractionis 60-65 %.   The right atrium appears mildly dilated.   Normal appearing right ventricle structure and function.   No evidence of pericardial effusion.   No evidence of pleural effusion.    < end of copied text >

## 2019-03-22 NOTE — SWALLOW BEDSIDE ASSESSMENT ADULT - NS SPL SWALLOW CLINIC TRIAL FT
Oropharyngeal Swallowing abilities subjectively appeared to be functional for age when she was in an alert state. No behavioral aspiration signs exhibited on exam. Odynophagia was denied.

## 2019-03-22 NOTE — PHYSICAL THERAPY INITIAL EVALUATION ADULT - PERTINENT HX OF CURRENT PROBLEM, REHAB EVAL
Pt admitted to  secondary to left sided weakness and dysphagia. CT head: neg, CT angio head: neg for acute changes. CT angio neck: neg for acute changes.

## 2019-03-22 NOTE — SWALLOW BEDSIDE ASSESSMENT ADULT - SWALLOW EVAL: FEEDING ASSISTANCE
PT WAS ABLE TO FEED SELF ON EXAM BUT HER LUE IS WEAK. AS SUCH, SHE MAY BENEFIT FROM ASSISTANCE WITH TRAY SET UP/FEEDING AT TIMES.

## 2019-03-22 NOTE — CONSULT NOTE ADULT - PROBLEM SELECTOR RECOMMENDATION 4
Large rt. pleural effusion noted on CT and no clinical signs of CHF though has some degree of valvular heart disease.  Will discuss further intervention with hospitalist given pt.s age and mental state/capacity.

## 2019-03-22 NOTE — CONSULT NOTE ADULT - PROBLEM SELECTOR RECOMMENDATION 9
After discussion with granddaughter and son a decision was made to start AC given the second event on aspirin.  Continue rate control with metoprolol

## 2019-03-22 NOTE — ED ADULT NURSE REASSESSMENT NOTE - NS ED NURSE REASSESS COMMENT FT1
vss. pt cleaned, turned and positioned. denies pain. neuro checks unchanged. awaiting bed placement, will continue to monitor

## 2019-03-22 NOTE — PROGRESS NOTE ADULT - ASSESSMENT
Assessment and Recommendation:   · Assessment		  Pt is a 89 year old woman with a PMH of HTN, TIA, and mild dementia who presented to the ED with her son and granddaughter c/o left sided weakness which was noted earlier this morning.     # STROKE  -CTA to r/o large vessel occlusion  did not reveal LVO  -If CTA is negative admit to medicine for stroke work up   -Telemetry monitoring   -check lipids  -Physcial Therapy   -Repeat CT in AM  No acute event  -MRI brain if HIP replacement compatible with MRI  - Speech evaluation.  - Cardilogy F/u.  -F/u with  on call Neurology from tomorrow.
This is a pleasant  y.o F PMH of CARMEL mccauley on Aspirin who presented as a CODE STROKE for left sided weakness and ataxia outside window for TPA with improving symptoms:     # CVA - with motor deficits improving.   -Initial NIHSS 5 however outside window for TPA on admission.   - patient with CARMEL mccauley not on full dose AC 2/2 hx of falls and increased risk of castrophophic hemorrhage in the setting of advanced age.   - discussed with son options as he inquired about starting Xarelto, discussed risk of bleeding. He will discuss with family and await neuro / cardiology input.   - negative CTA head and neck for CVA / thrombosis.   - check repeat AM CT Head now.   - lipid panel although on moderate intensity statin Atorvastatin 40mg QHS.   - pre-diabetic, A1c 6.3% in January.   - PT consult and dc planning.     # Uncontrolled HTN - will resume Metoprolol but divide 25mg BID.     # Large Right Pleural Effusion - incidental finding on CT chest, currently without any respiratory distress.   - outpatient pulmonary follow up.     DVT ppx: start Lovenox   Dispo: admitted to telemetry, dc in 1-2 days pending PT.   Discussed with RN and family.           %

## 2019-03-22 NOTE — CHART NOTE - NSCHARTNOTEFT_GEN_A_CORE
Hospitalist Update Note    Discussed case 2x with Ector (son) and grand-daughter Sasha along with Dr. Bai and Dr. Cesar.     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   - decrease full dose Aspirin to 81mg daily.   - hold on further workup/ intervention of large right pleural effusion given patient's stable respiratory status and advanced age.   - monitor for signs of bleeding.     Likely dc tomorrow with home PT/ home care per family wishes.

## 2019-03-22 NOTE — SWALLOW BEDSIDE ASSESSMENT ADULT - SWALLOW EVAL: DIAGNOSIS
1) The pt exhibits feeding compromise due to LUE weakness which is atop Oropharyngeal Swallowing abilities which subjectively appear to be stable and within functional parameters for age. NO behavioral aspiration signs exhibited on exam. Odynophagia was denied.   2) The patient was awake but somewhat fatigued. She was interactive. The pt was able to verbalize during communicative probes and in conversation. Pt's verbalizations were intelligible, fluent, linguistically intact and contextually appropriate. Her motor speech and language abilities are within functional parameters. The pt was able to effectively verbalize her needs when in an alert state and is reportedly at communicative baseline. 1) The pt exhibits feeding compromise due to LUE weakness which is atop Oropharyngeal Swallowing abilities which subjectively appear to be stable and within functional parameters for age when she is in an alert state.  NO behavioral aspiration signs exhibited on exam. Odynophagia was denied.   2) The patient was awake but somewhat fatigued. She was interactive. The pt was able to verbalize during communicative probes and in conversation. Pt's verbalizations were intelligible, fluent, linguistically intact and contextually appropriate. Her motor speech and language abilities are within functional parameters. The pt was able to effectively verbalize her needs when in an alert state and is reportedly at communicative baseline.

## 2019-03-22 NOTE — PHYSICAL THERAPY INITIAL EVALUATION ADULT - ACTIVE RANGE OF MOTION EXAMINATION, REHAB EVAL
Right LE Active ROM was WFL (within functional limits)/Right UE Active ROM was WFL (within functional limits)/Left UE shouler flex ~ 90 degrees, left LE hip flex ~ 90 degrees and DF neutral.

## 2019-03-22 NOTE — SWALLOW BEDSIDE ASSESSMENT ADULT - COMMENTS
The patient was admitted to  after being found on the floor with left sided weakness. A CVA is suspected. She is followed by neuro. Note that pt was admitted to  in 1/19 with onset of Aphasia that resolved. A TIA was suspected. This profile is superimposed upon a history of dementia, osteopenia, HTN, A-Fib, and prior hip surgery NOS.

## 2019-03-22 NOTE — ED ADULT NURSE REASSESSMENT NOTE - COMFORT CARE
side rails up/assisted to bedpan/repositioned
meal provided/side rails up/warm blanket provided/plan of care explained

## 2019-03-22 NOTE — ED ADULT NURSE REASSESSMENT NOTE - NS ED NURSE REASSESS COMMENT FT1
rounded on pt. sleeping comfortably. vss. no s/s of acute distress noted. pending bed placement. will continue to monitor

## 2019-03-22 NOTE — PHYSICAL THERAPY INITIAL EVALUATION ADULT - COORDINATION ASSESSED, REHAB EVAL
Right UE and LE min impairment ,and left UE and LE mod impairment./heel to shin/finger to nose Right UE and LE min impairment ,and left UE and LE mod impairment./finger to nose/heel to shin

## 2019-03-22 NOTE — PHYSICAL THERAPY INITIAL EVALUATION ADULT - PLANNED THERAPY INTERVENTIONS, PT EVAL
strengthening/Increase mobility as tolerated. Eval./ROM ROM/transfer training/gait training/Increase mobility as tolerated. Eval. Pt left on stretcher in ER with bilateral siderails up./strengthening

## 2019-03-22 NOTE — ED ADULT NURSE REASSESSMENT NOTE - NS ED NURSE REASSESS COMMENT FT1
rounded on pt. cleaned pt and put on clean brief. denies pain. vss. pending bed placement. will continue to monitor

## 2019-03-22 NOTE — SWALLOW BEDSIDE ASSESSMENT ADULT - SLP GENERAL OBSERVATIONS
The patient was awake but somewhat fatigued. She was interactive. The pt was able to verbalize during communicative probes and in conversation. Pt's verbalizations were intelligible, fluent, linguistically intact and contextually appropriate. Her motor speech and language abilities are within functional parameters. The pt was able to effectively verbalize her needs and is reportedly at communicative baseline.  Note that pt denied Dysphagia.

## 2019-03-23 ENCOUNTER — TRANSCRIPTION ENCOUNTER (OUTPATIENT)
Age: 84
End: 2019-03-23

## 2019-03-23 VITALS — OXYGEN SATURATION: 97 % | RESPIRATION RATE: 16 BRPM

## 2019-03-23 PROCEDURE — 99232 SBSQ HOSP IP/OBS MODERATE 35: CPT

## 2019-03-23 PROCEDURE — 93010 ELECTROCARDIOGRAM REPORT: CPT

## 2019-03-23 RX ORDER — RIVAROXABAN 15 MG-20MG
1 KIT ORAL
Qty: 30 | Refills: 0 | OUTPATIENT
Start: 2019-03-23 | End: 2019-04-21

## 2019-03-23 RX ORDER — METOPROLOL TARTRATE 50 MG
1 TABLET ORAL
Qty: 60 | Refills: 0 | OUTPATIENT
Start: 2019-03-23

## 2019-03-23 RX ORDER — LISINOPRIL 2.5 MG/1
1 TABLET ORAL
Qty: 30 | Refills: 0 | OUTPATIENT
Start: 2019-03-23

## 2019-03-23 RX ORDER — ASPIRIN/CALCIUM CARB/MAGNESIUM 324 MG
1 TABLET ORAL
Qty: 30 | Refills: 0
Start: 2019-03-23

## 2019-03-23 RX ADMIN — Medication 25 MILLIGRAM(S): at 06:15

## 2019-03-23 RX ADMIN — Medication 81 MILLIGRAM(S): at 11:34

## 2019-03-23 NOTE — PROGRESS NOTE ADULT - SUBJECTIVE AND OBJECTIVE BOX
HPI: Pt is a 89 year old woman with a PMH of HTN, TIA, and mild dementia who presented to the ED with her son and granddaughter c/o left sided weakness.  As per the son, the patient was found on the floor next to her bed early this am, she was unable to ambulate at that time, her family helped her to the bathroom and then put her back to bed. The patient was again found on the floor next to her bed at 3:30pm. She was found to have weakness in her left arm and leg. Her family gave her 325mg of Aspirin.  The patient presented to the ED at 6:28pm and was taken directly to CT as a CODE STROKE. The patient has a NIHSS of 5, for left arm weakness, left leg drift and two extremity ataxia.  She is not a candidate for t-PA as she is outside the time window.   3/22/19 : Pt sleeping arousable , left side weakness persisting but improved in UE but LE  Weakness  unchanged . able to follow commands.    MEDICATIONS  (STANDING):  aspirin enteric coated 325 milliGRAM(s) Oral daily  atorvastatin 40 milliGRAM(s) Oral at bedtime  metoprolol tartrate 25 milliGRAM(s) Oral two times a day      Vital Signs Last 24 Hrs  T(C): 37.2 (22 Mar 2019 07:01), Max: 37.2 (22 Mar 2019 05:37)  T(F): 99 (22 Mar 2019 07:01), Max: 99 (22 Mar 2019 07:01)  HR: 74 (22 Mar 2019 05:37) (55 - 81)  BP: 176/93 (22 Mar 2019 07:01) (132/66 - 176/93)  BP(mean): --  RR: 17 (22 Mar 2019 07:01) (17 - 18)  SpO2: 99% (22 Mar 2019 07:01) (90% - 100%)    Neurological exam:  HF: Sleeping  but arousable, follows commands, Speech fluent, No Aphasia   CN: PEARRL, EOMI, VFF, facial sensation normal, no NLFD, gag not tested  Motor: Right upper and lower 5/5, LUE 4/5, LLE 3/5  Sens: Intact to light touch    Reflexes: depressed throughout, downgoing toes b/l  Coord:  dysmetria finger to nose and heal/shin on LEFT   Gait/Balance: Not tested     NIHSS:  5                        11.9   6.96  )-----------( 191      ( 22 Mar 2019 05:51 )             37.3     03-22    145  |  108  |  16  ----------------------------<  106<H>  3.6   |  30  |  0.67    Ca    8.8      22 Mar 2019 05:51  Phos  3.2     03-22  Mg     1.9     03-22    TPro  6.5  /  Alb  3.2<L>  /  TBili  1.2  /  DBili  x   /  AST  43<H>  /  ALT  65  /  AlkPhos  162<H>  03-22      Radiology report:  - CT Head  < from: CT Head No Cont (03.22.19 @ 09:53) >  IMPRESSION:    No acute intracranial abnormality. Age-related involutional changes as   above.  < from: CT Angio Head w/ IV Cont (03.21.19 @ 21:24) >  IMPRESSION:   1. Incompletely visualized large right pleural effusion.   2. Mild atherosclerotic calcification of the distal right vertebral   artery   without significant luminal narrowing. No thrombosis or occlusion.   3. No acute vascular findings.     COMMENT:    Reference per NASCET criteria for degree of stenosis: Mild: less than   50%   stenosis. Moderate: 50-69% stenosis. Severe: 70-94% stenosis. Near   occlusion:   95-99% stenosis.    < end of copied text >
CC: left sided weakness / slurred speech    Subj: Chart reviewed, no complaints. Feels weak with left arm drifting when trying to drinking coffee otherwise no HA/ chest pain / SOB. Lengthy discussion with son Ector on the phone regarding case. Initial NIHSS  however outside window for TPA on admission.     ROS: stated above.     Vital Signs Last 24 Hrs  T(C): 37.2 (22 Mar 2019 07:01), Max: 37.2 (22 Mar 2019 05:37)  T(F): 99 (22 Mar 2019 07:01), Max: 99 (22 Mar 2019 07:01)  HR: 74 (22 Mar 2019 05:37) (55 - 81)  BP: 176/93 (22 Mar 2019 07:01) (132/66 - 176/93)  BP(mean): --  RR: 17 (22 Mar 2019 07:01) (17 - 18)  SpO2: 99% (22 Mar 2019 07:01) (90% - 100%)    PHYSICAL EXAM:  Constitutional: NAD, awake and alert, thin elderly female. Appears comfortable.   HEENT: PERR, EOMI, Mildly hard of Hearing, MMM  Neck: Soft and supple, No LAD, No JVD  Respiratory: Breath sounds are clear bilaterally, No wheezing, rales or rhonchi  Cardiovascular: S1 and S2, regular rate and irregularly irregular rhythm  Gastrointestinal: Bowel Sounds present, soft, nontender, nondistended, no guarding, no rebound  Extremities: No peripheral edema  Vascular: 2+ peripheral pulses  Neurological: A/O x , mild left arm and leg weakness.   Musculoskeletal: 5/5 strength right leg and right arm, 4/5 left arm / leg.   Skin: No rashes    MEDICATIONS  (STANDING):  aspirin enteric coated 325 milliGRAM(s) Oral daily  atorvastatin 40 milliGRAM(s) Oral at bedtime  metoprolol tartrate 25 milliGRAM(s) Oral two times a day      LABS: All Labs Reviewed:                        11.9   6.96  )-----------( 191      ( 22 Mar 2019 05:51 )             37.3     03-22    145  |  108  |  16  ----------------------------<  106<H>  3.6   |  30  |  0.67    Ca    8.8      22 Mar 2019 05:51  Phos  3.2     03-22  Mg     1.9     03-22    TPro  6.5  /  Alb  3.2<L>  /  TBili  1.2  /  DBili  x   /  AST  43<H>  /  ALT  65  /  AlkPhos  162<H>  03-22    PT/INR - ( 21 Mar 2019 18:42 )   PT: 13.3 sec;   INR: 1.19 ratio    PTT - ( 21 Mar 2019 18:42 )  PTT:28.9 sec     CT Angio Head w/ IV Cont (03.21.19 @ 21:24) >  NECK:    Bones/joints: No acute fracture.    Soft tissues: Normal. No significant soft tissue swelling.    Lungs:  Incompletely visualized large right pleural effusion.   Indeterminate 3   mm left upper lobe pulmonary nodule.     IMPRESSION:   1. Incompletely visualized large right pleural effusion.   2. Mild atherosclerotic calcification of the distal right vertebral   artery   without significant luminal narrowing. No thrombosis or occlusion.   3. No acute vascular findings.
HPI:  89 y/o F PMHx significant for Dementia, HTN, osteopenia, persitent atrial fibrillation on asa(previous TIA but declined AC due to fall risk), htn, hld was biba due to left sided weakness.  She slid off the bed and was unable to get up until her son found her on the floor.  The pt. denies LOC, head injury.  son stated she was unable to hold her cane or walk properly due to weakness in her arm and leg and also had associated slurred speech.  In ED, cth-no acute changes. no TPA given.  Now symptoms much better.     3/23/19: Reports feeling better and denies chest pain or SOB.     PAST MEDICAL & SURGICAL HISTORY:  History of hip replacement  History of lumpectomy  HTN  Persistent Atrial fibrillation    SOCIAL HISTORY: Non-Smoker/No ETOH/ No Ilicit Drug use.    FAMILY HISTORY:  Family history of early CAD (Father)    Allergies  No Known Allergies    Home Medications:  aspirin 325 mg oral delayed release tablet: 1 tab(s) orally once a day (07 Jan 2019 14:32)    MEDICATIONS  (STANDING):  aspirin enteric coated 81 milliGRAM(s) Oral daily  atorvastatin 40 milliGRAM(s) Oral at bedtime  metoprolol tartrate 25 milliGRAM(s) Oral two times a day  rivaroxaban 15 milliGRAM(s) Oral every 24 hours    MEDICATIONS  (PRN):  acetaminophen   Tablet .. 650 milliGRAM(s) Oral every 6 hours PRN Mild Pain (1 - 3)      Vital Signs Last 24 Hrs  T(C): 37.1 (23 Mar 2019 10:54), Max: 37.1 (23 Mar 2019 10:54)  T(F): 98.7 (23 Mar 2019 10:54), Max: 98.7 (23 Mar 2019 10:54)  HR: 73 (23 Mar 2019 10:54) (73 - 75)  BP: 147/61 (23 Mar 2019 10:54) (147/61 - 164/68)  BP(mean): --  RR: 18 (23 Mar 2019 10:54) (18 - 20)  SpO2: 100% (23 Mar 2019 10:54) (99% - 100%)    I&O's Detail      Daily     Daily      PHYSICAL EXAM:  Constitutional: NAD, awake, well-developed  HEENT: PERRLA, EOMI,  No oral cyanosis.   Neck:  supple,  No JVD, No Thyroid enlargement. No Carotid Bruits bilaterally.  Respiratory: Breath sounds are clear bilaterally, No wheezing, rales or rhonchi  Cardiovascular: NL S1 and S2, IR, IR, 1-6 ELLI to RUSB, No S3, No s4,  Gastrointestinal: Bowel Sounds present, soft   Extremities: No peripheral edema. No clubbing or cyanosis.   Vascular: 1+ peripheral pulses in LE   Neurological: CN II-XII intact, Motor Strength- 3-4/5 LEFT upper and lower extremity; 5/5 in RIGHT upper/lower extremity.  +protonator drift. DTRs 2+ intact and symmetric. Gait not tested.  Skin: No rashes.      LABS: All Labs Reviewed:                        11.9   6.96  )-----------( 191      ( 22 Mar 2019 05:51 )             37.3     03-22    145  |  108  |  16  ----------------------------<  106<H>  3.6   |  30  |  0.67    Ca    8.8      22 Mar 2019 05:51  Phos  3.2     03-22  Mg     1.9     03-22    TPro  6.5  /  Alb  3.2<L>  /  TBili  1.2  /  DBili  x   /  AST  43<H>  /  ALT  65  /  AlkPhos  162<H>  03-22        LIVER FUNCTIONS - ( 22 Mar 2019 05:51 )  Alb: 3.2 g/dL / Pro: 6.5 gm/dL / ALK PHOS: 162 U/L / ALT: 65 U/L / AST: 43 U/L / GGT: x           PT/INR - ( 21 Mar 2019 18:42 )   PT: 13.3 sec;   INR: 1.19 ratio         PTT - ( 21 Mar 2019 18:42 )  PTT:28.9 sec      EKG: Atrial Fib with mild VR.    < from: Transthoracic Echocardiogram (01.07.19 @ 09:13) >   Fibrocalcific changes noted to the mitral valve leaflets with preserved   leaflet excursion.   Mild mitral annular calcification is present.   Moderate (2+) mitral regurgitation is present.   Fibrocalcific changes noted to the Aortic valve leaflets with preserved   leaflet excursion.   Mild (1+) aortic regurgitation is present.   Moderate to severe (3+) tricuspid valve regurgitation is present.   Normal appearing pulmonic valve structure and function.   Mild pulmonic valvular regurgitation (1+) is present.   The left atrium is mildly dilated.   The left ventricle is normal in size, wall thickness, wall motion and   contractility.   Estimated left ventricular ejection fractionis 60-65 %.   The right atrium appears mildly dilated.   Normal appearing right ventricle structure and function.   No evidence of pericardial effusion.   No evidence of pleural effusion.    < end of copied text >      TELE: Afib with CVR

## 2019-03-23 NOTE — DISCHARGE NOTE NURSING/CASE MANAGEMENT/SOCIAL WORK - NSDCDPATPORTLINK_GEN_ALL_CORE
You can access the [facility] Patient Portal, offered by [facility], by registering with the following website: [email or web address]

## 2019-03-23 NOTE — DISCHARGE NOTE PROVIDER - CARE PROVIDER_API CALL
Ifeanyi Bai)  Cardiovascular Disease  [address] [place] [address]  Phone: [phone]  Fax: [phone]  Follow Up Time:     Bob Norwood)  Internal Medicine  [address] [place] [address]  Phone: [phone]  Fax: [phone]  Follow Up Time:     Lorrie Cesar)  Neurology  [address], [address]  [place] [address]  Phone: [phone]  Fax: [phone]  Follow Up Time:

## 2019-03-23 NOTE — DISCHARGE NOTE NURSING/CASE MANAGEMENT/SOCIAL WORK - NSDCPEPTSTRK_GEN_ALL_CORE
Risk factors for stroke/Stroke warning signs and symptoms/Call 911 for stroke/Stroke support groups for patients, families, and friends/Need for follow up after discharge/Stroke education booklet/Prescribed medications/Signs and symptoms of stroke

## 2019-03-23 NOTE — DISCHARGE NOTE PROVIDER - NSDCQMMRS_NEU_ALL_CORE
4 - Moderately severe disability. Unable to own bodily needs without assistance and unable to walk unassisted

## 2019-03-23 NOTE — DISCHARGE NOTE PROVIDER - NSDCCPCAREPLAN_GEN_ALL_CORE_FT
PRINCIPAL DISCHARGE DIAGNOSIS  Diagnosis: CVA (cerebral vascular accident)  Assessment and Plan of Treatment: Start Xaretlo and monitor for bleeding.   - get repeat blood pressure check in 1 week with PCP.

## 2019-03-23 NOTE — DISCHARGE NOTE PROVIDER - HOSPITAL COURSE
CC: left sided weakness / slurred speech        Admitted for CVA, likely embolic 2/2 no AC In the setting of known A. fib. Patient's family decided to start Xarelto.         3/23: Still with some left arm weakness and leg numbness but follows all commands and can lift all limbs to gravity. Denies CP /SOB and eager for dc home.         ROS: stated above.         Vital Signs Last 24 Hrs    T(C): 36.7 (23 Mar 2019 04:59), Max: 36.7 (22 Mar 2019 11:59)    T(F): 98.1 (23 Mar 2019 04:59), Max: 98.1 (22 Mar 2019 11:59)    HR: 75 (23 Mar 2019 04:59) (73 - 90)    BP: 164/68 (23 Mar 2019 04:59) (148/74 - 175/74)    BP(mean): --    RR: 18 (23 Mar 2019 04:59) (18 - 20)    SpO2: 100% (23 Mar 2019 04:59) (98% - 100%)        PHYSICAL EXAM:    Constitutional: NAD, awake and alert, thin elderly female. Appears comfortable.     HEENT: PERR, EOMI, Mildly hard of Hearing, MMM    Neck: Soft and supple, No LAD, No JVD    Respiratory: Breath sounds are clear bilaterally, No wheezing, rales or rhonchi    Cardiovascular: S1 and S2, regular rate and irregularly irregular rhythm    Gastrointestinal: Bowel Sounds present, soft, nontender, nondistended, no guarding, no rebound    Extremities: No peripheral edema    Vascular: 2+ peripheral pulses    Neurological: A/O x , mild left arm and leg weakness.     Musculoskeletal: 5/5 strength right leg and right arm, 4/5 left arm / leg. Mild left arm ataxia    Skin: No rashes        All labs/ images reviewed.      CT Angio Head w/ IV Cont (03.21.19 @ 21:24) >    NECK:      Bones/joints: No acute fracture.      Soft tissues: Normal. No significant soft tissue swelling.      Lungs:  Incompletely visualized large right pleural effusion.     Indeterminate 3     mm left upper lobe pulmonary nodule.         IMPRESSION:     1. Incompletely visualized large right pleural effusion.     2. Mild atherosclerotic calcification of the distal right vertebral     artery     without significant luminal narrowing. No thrombosis or occlusion.     3. No acute vascular findings.         Assessment and Plan:        This is a pleasant  y.o F PMH of A. fib on Aspirin who presented as a CODE STROKE for left sided weakness and ataxia outside window for TPA with improving symptoms:         # CVA - with motor deficits improving.     -Initial NIHSS 5 however outside window for TPA on admission.     - patient with CARMEL mccauley not on full dose AC 2/2 hx of falls and increased risk of castrophophic hemorrhage in the setting of advanced age.     - discussed with son options as he inquired about starting Xarelto, discussed risk of bleeding --> both grand-daughter and son prefer Xarelto.     - negative CTA head and neck for CVA / thrombosis.     - repeat head CT stable.     - lipid panel although on moderate intensity statin Atorvastatin 40mg QHS.     - pre-diabetic, A1c 6.3% in January.     - PT consult and dc planning --> dc home w/ home PT. Family prefers home dc as opposed to [facility].        # Uncontrolled HTN - will resumed Metoprolol but divide 25mg BID.     - add low dose lisinopril 2.5 mg daily        # Large Right Pleural Effusion - incidental finding on CT chest, currently without any respiratory distress.     - outpatient pulmonary follow up.         DC home today. total time > 35 mins    Discussed with RN and family.

## 2019-03-23 NOTE — DISCHARGE NOTE PROVIDER - CARE PROVIDERS DIRECT ADDRESSES
[email or web address]

## 2019-03-25 ENCOUNTER — INPATIENT (INPATIENT)
Facility: HOSPITAL | Age: 84
LOS: 2 days | Discharge: NURSING, REHAB OR LONG-TERM CARE | End: 2019-03-28
Attending: INTERNAL MEDICINE | Admitting: INTERNAL MEDICINE
Payer: MEDICARE

## 2019-03-25 VITALS
DIASTOLIC BLOOD PRESSURE: 85 MMHG | SYSTOLIC BLOOD PRESSURE: 167 MMHG | HEART RATE: 80 BPM | RESPIRATION RATE: 15 BRPM | TEMPERATURE: 98 F | HEIGHT: 67 IN | OXYGEN SATURATION: 95 % | WEIGHT: 134.92 LBS

## 2019-03-25 DIAGNOSIS — Z96.649 PRESENCE OF UNSPECIFIED ARTIFICIAL HIP JOINT: Chronic | ICD-10-CM

## 2019-03-25 DIAGNOSIS — Z98.890 OTHER SPECIFIED POSTPROCEDURAL STATES: Chronic | ICD-10-CM

## 2019-03-25 PROBLEM — G45.9 TRANSIENT CEREBRAL ISCHEMIC ATTACK, UNSPECIFIED: Chronic | Status: ACTIVE | Noted: 2019-03-21

## 2019-03-25 LAB
ALBUMIN SERPL ELPH-MCNC: 2.9 G/DL — LOW (ref 3.3–5)
ALP SERPL-CCNC: 141 U/L — HIGH (ref 40–120)
ALT FLD-CCNC: 44 U/L — SIGNIFICANT CHANGE UP (ref 12–78)
ANION GAP SERPL CALC-SCNC: 6 MMOL/L — SIGNIFICANT CHANGE UP (ref 5–17)
APPEARANCE UR: CLEAR — SIGNIFICANT CHANGE UP
AST SERPL-CCNC: 29 U/L — SIGNIFICANT CHANGE UP (ref 15–37)
BACTERIA # UR AUTO: ABNORMAL
BASOPHILS # BLD AUTO: 0.08 K/UL — SIGNIFICANT CHANGE UP (ref 0–0.2)
BASOPHILS NFR BLD AUTO: 1 % — SIGNIFICANT CHANGE UP (ref 0–2)
BILIRUB SERPL-MCNC: 1.2 MG/DL — SIGNIFICANT CHANGE UP (ref 0.2–1.2)
BILIRUB UR-MCNC: NEGATIVE — SIGNIFICANT CHANGE UP
BUN SERPL-MCNC: 17 MG/DL — SIGNIFICANT CHANGE UP (ref 7–23)
CALCIUM SERPL-MCNC: 8.3 MG/DL — LOW (ref 8.5–10.1)
CHLORIDE SERPL-SCNC: 109 MMOL/L — HIGH (ref 96–108)
CO2 SERPL-SCNC: 30 MMOL/L — SIGNIFICANT CHANGE UP (ref 22–31)
COLOR SPEC: YELLOW — SIGNIFICANT CHANGE UP
CREAT SERPL-MCNC: 0.8 MG/DL — SIGNIFICANT CHANGE UP (ref 0.5–1.3)
DIFF PNL FLD: ABNORMAL
EOSINOPHIL # BLD AUTO: 0.13 K/UL — SIGNIFICANT CHANGE UP (ref 0–0.5)
EOSINOPHIL NFR BLD AUTO: 1.7 % — SIGNIFICANT CHANGE UP (ref 0–6)
EPI CELLS # UR: NEGATIVE — SIGNIFICANT CHANGE UP
GLUCOSE SERPL-MCNC: 129 MG/DL — HIGH (ref 70–99)
GLUCOSE UR QL: NEGATIVE MG/DL — SIGNIFICANT CHANGE UP
HCT VFR BLD CALC: 38.8 % — SIGNIFICANT CHANGE UP (ref 34.5–45)
HGB BLD-MCNC: 12.2 G/DL — SIGNIFICANT CHANGE UP (ref 11.5–15.5)
IMM GRANULOCYTES NFR BLD AUTO: 0.4 % — SIGNIFICANT CHANGE UP (ref 0–1.5)
KETONES UR-MCNC: ABNORMAL
LACTATE SERPL-SCNC: 1.2 MMOL/L — SIGNIFICANT CHANGE UP (ref 0.7–2)
LEUKOCYTE ESTERASE UR-ACNC: ABNORMAL
LYMPHOCYTES # BLD AUTO: 1.46 K/UL — SIGNIFICANT CHANGE UP (ref 1–3.3)
LYMPHOCYTES # BLD AUTO: 19.1 % — SIGNIFICANT CHANGE UP (ref 13–44)
MAGNESIUM SERPL-MCNC: 1.7 MG/DL — SIGNIFICANT CHANGE UP (ref 1.6–2.6)
MCHC RBC-ENTMCNC: 31 PG — SIGNIFICANT CHANGE UP (ref 27–34)
MCHC RBC-ENTMCNC: 31.4 GM/DL — LOW (ref 32–36)
MCV RBC AUTO: 98.5 FL — SIGNIFICANT CHANGE UP (ref 80–100)
MONOCYTES # BLD AUTO: 0.8 K/UL — SIGNIFICANT CHANGE UP (ref 0–0.9)
MONOCYTES NFR BLD AUTO: 10.5 % — SIGNIFICANT CHANGE UP (ref 2–14)
NEUTROPHILS # BLD AUTO: 5.13 K/UL — SIGNIFICANT CHANGE UP (ref 1.8–7.4)
NEUTROPHILS NFR BLD AUTO: 67.3 % — SIGNIFICANT CHANGE UP (ref 43–77)
NITRITE UR-MCNC: NEGATIVE — SIGNIFICANT CHANGE UP
NRBC # BLD: 0 /100 WBCS — SIGNIFICANT CHANGE UP (ref 0–0)
PH UR: 6.5 — SIGNIFICANT CHANGE UP (ref 5–8)
PHOSPHATE SERPL-MCNC: 3.1 MG/DL — SIGNIFICANT CHANGE UP (ref 2.5–4.5)
PLATELET # BLD AUTO: 230 K/UL — SIGNIFICANT CHANGE UP (ref 150–400)
POTASSIUM SERPL-MCNC: 3.7 MMOL/L — SIGNIFICANT CHANGE UP (ref 3.5–5.3)
POTASSIUM SERPL-SCNC: 3.7 MMOL/L — SIGNIFICANT CHANGE UP (ref 3.5–5.3)
PROT SERPL-MCNC: 6.4 GM/DL — SIGNIFICANT CHANGE UP (ref 6–8.3)
PROT UR-MCNC: 15 MG/DL
RBC # BLD: 3.94 M/UL — SIGNIFICANT CHANGE UP (ref 3.8–5.2)
RBC # FLD: 15.3 % — HIGH (ref 10.3–14.5)
RBC CASTS # UR COMP ASSIST: SIGNIFICANT CHANGE UP /HPF (ref 0–4)
SODIUM SERPL-SCNC: 145 MMOL/L — SIGNIFICANT CHANGE UP (ref 135–145)
SP GR SPEC: 1.02 — SIGNIFICANT CHANGE UP (ref 1.01–1.02)
UROBILINOGEN FLD QL: 4 MG/DL
WBC # BLD: 7.63 K/UL — SIGNIFICANT CHANGE UP (ref 3.8–10.5)
WBC # FLD AUTO: 7.63 K/UL — SIGNIFICANT CHANGE UP (ref 3.8–10.5)
WBC UR QL: SIGNIFICANT CHANGE UP

## 2019-03-25 PROCEDURE — 71045 X-RAY EXAM CHEST 1 VIEW: CPT | Mod: 26

## 2019-03-25 PROCEDURE — 70450 CT HEAD/BRAIN W/O DYE: CPT | Mod: 26

## 2019-03-25 PROCEDURE — 93010 ELECTROCARDIOGRAM REPORT: CPT

## 2019-03-25 PROCEDURE — 99285 EMERGENCY DEPT VISIT HI MDM: CPT

## 2019-03-25 RX ORDER — ASPIRIN/CALCIUM CARB/MAGNESIUM 324 MG
81 TABLET ORAL DAILY
Qty: 0 | Refills: 0 | Status: DISCONTINUED | OUTPATIENT
Start: 2019-03-25 | End: 2019-03-28

## 2019-03-25 RX ORDER — METOPROLOL TARTRATE 50 MG
25 TABLET ORAL
Qty: 0 | Refills: 0 | Status: DISCONTINUED | OUTPATIENT
Start: 2019-03-25 | End: 2019-03-28

## 2019-03-25 RX ORDER — LISINOPRIL 2.5 MG/1
2.5 TABLET ORAL DAILY
Qty: 0 | Refills: 0 | Status: DISCONTINUED | OUTPATIENT
Start: 2019-03-25 | End: 2019-03-26

## 2019-03-25 RX ORDER — RIVAROXABAN 15 MG-20MG
15 KIT ORAL EVERY 24 HOURS
Qty: 0 | Refills: 0 | Status: DISCONTINUED | OUTPATIENT
Start: 2019-03-25 | End: 2019-03-28

## 2019-03-25 RX ORDER — ATORVASTATIN CALCIUM 80 MG/1
40 TABLET, FILM COATED ORAL AT BEDTIME
Qty: 0 | Refills: 0 | Status: DISCONTINUED | OUTPATIENT
Start: 2019-03-25 | End: 2019-03-28

## 2019-03-25 RX ORDER — SODIUM CHLORIDE 9 MG/ML
1000 INJECTION INTRAMUSCULAR; INTRAVENOUS; SUBCUTANEOUS ONCE
Qty: 0 | Refills: 0 | Status: COMPLETED | OUTPATIENT
Start: 2019-03-25 | End: 2019-03-25

## 2019-03-25 RX ADMIN — LISINOPRIL 2.5 MILLIGRAM(S): 2.5 TABLET ORAL at 18:48

## 2019-03-25 RX ADMIN — Medication 25 MILLIGRAM(S): at 18:44

## 2019-03-25 RX ADMIN — RIVAROXABAN 15 MILLIGRAM(S): KIT at 18:44

## 2019-03-25 RX ADMIN — Medication 81 MILLIGRAM(S): at 18:44

## 2019-03-25 RX ADMIN — ATORVASTATIN CALCIUM 40 MILLIGRAM(S): 80 TABLET, FILM COATED ORAL at 21:39

## 2019-03-25 RX ADMIN — SODIUM CHLORIDE 500 MILLILITER(S): 9 INJECTION INTRAMUSCULAR; INTRAVENOUS; SUBCUTANEOUS at 12:05

## 2019-03-25 NOTE — ED ADULT TRIAGE NOTE - CHIEF COMPLAINT QUOTE
seen in ED last week on thursday for stroke symptoms, as per son and ems worsening confusion since then, recommended by private md to come back to ED for reevaluation.

## 2019-03-25 NOTE — ED ADULT NURSE NOTE - CHIEF COMPLAINT QUOTE
seen in ED last week on thursday for stroke symptoms, as per son and ems worsening confusion since then, recommended by private md to come back to ED for reevaluation. seen in ED last week on Thursday for stroke symptoms, as per son and ems worsening confusion since then, recommended by private md to come back to ED for reevaluation.

## 2019-03-25 NOTE — ED PROVIDER NOTE - CLINICAL SUMMARY MEDICAL DECISION MAKING FREE TEXT BOX
female with recent tia workup, pw worsening symptoms. 24 hours after worsening, on xarelto, NIH 5. will obtain CT head, labs, cxr urine, cultures. differential includes evolving CVA vs hemorraghic vs infectious etiology of confusion. likely admit for care.

## 2019-03-25 NOTE — PHYSICAL THERAPY INITIAL EVALUATION ADULT - PERTINENT HX OF CURRENT PROBLEM, REHAB EVAL
89 y/o female with h/o Afib, Htn, HLD, Rt effusion,  L parietal CVA one week ago, discharge from the hospital 2 days ago on Xarelto, presents to the ED for worsening slurred speech and worsening L side weakness.

## 2019-03-25 NOTE — ED PROVIDER NOTE - ATTENDING CONTRIBUTION TO CARE
I, Bibi Ny MD, personally saw the patient with the resident, and completed the key components of the history and physical exam. I then discussed the management plan with the resident.

## 2019-03-25 NOTE — H&P ADULT - HISTORY OF PRESENT ILLNESS
History of Present Illness:   89 y/o female with h/o Afib, Htn, HLD, Rt effusion,  L parietal CVA one week ago, discharge from the hospital 2 days ago on Xarelto, presents to the ED for worsening slurred speech and worsening L side weakness.   Patient left the hospital 2 days ago with mild LUE weakness; she didn't go to Rehab and wanted to be dc home. At home her symptoms were fluctuating and this am got worse so she was brought in the ED for evaluation.  On my exam patient has no aphasia,       PMHx: as above  PSHx: none  Family Hx: sister had AAA and  age 92  Social History: lives at home with son   no Etoh, no smoking            REVIEW OF SYSTEMS:    CONSTITUTIONAL: No weakness, No fevers or chills  ENT: No ear ache, No sorethroat  NECK: No pain, No stiffness  RESPIRATORY: No cough, No wheezing, No hemoptysis; No dyspnea  CARDIOVASCULAR: No chest pain, No palpitations  GASTROINTESTINAL: No abd pain, No nausea, No vomiting, No hematemesis, No diarrhea or constipation. No melena, No hematochezia.  GENITOURINARY: No dysuria, No  hematuria  NEUROLOGICAL: No diplopia, No paresthesia, + Left side motor weakness   MUSCULOSKELETAL: No arthralgia, No myalgia  SKIN: No rashes, or lesions   PSYCH: no anxiety, no suicidal ideation    All other review of systems is negative unless indicated above    Vital Signs Last 24 Hrs  T(C): 36.7 (25 Mar 2019 15:01), Max: 36.7 (25 Mar 2019 15:01)  T(F): 98.1 (25 Mar 2019 15:01), Max: 98.1 (25 Mar 2019 15:01)  HR: 80 (25 Mar 2019 15:01) (80 - 80)  BP: 167/63 (25 Mar 2019 15:01) (167/63 - 167/85)  BP(mean): --  RR: 16 (25 Mar 2019 15:01) (15 - 16)  SpO2: 96% (25 Mar 2019 15:01) (95% - 96%)    PHYSICAL EXAM:    GENERAL: NAD, Well nourished  HEENT:  NC/AT, EOMI, PERRLA, No scleral icterus, Moist mucous membranes  NECK: Supple, No JVD  CNS:  Alert & Oriented X3, LUE motor 4/5, LLE motor 4/5  LUNG: Normal Breath sounds, Clear to auscultation bilaterally, No rales, No rhonchi, No wheezing  HEART: RRR; No murmurs, No rubs  ABDOMEN: +BS, ST/ND/NT  GENITOURINARY: Voiding, Bladder not distended  EXTREMITIES:  2+ Peripheral Pulses, No clubbing, No cyanosis, No tibial edema  MUSCULOSKELTAL: Joints normal ROM, No TTP, No effusion  VAGINAL: deferred  SKIN: no rashes  RECTAL: deferred, not indicated  BREAST: deferred                          12.2   7.63  )-----------( 230      ( 25 Mar 2019 11:33 )             38.8     -    145  |  109<H>  |  17  ----------------------------<  129<H>  3.7   |  30  |  0.80    Ca    8.3<L>      25 Mar 2019 11:33  Phos  3.1     -  Mg     1.7     -    TPro  6.4  /  Alb  2.9<L>  /  TBili  1.2  /  DBili  x   /  AST  29  /  ALT  44  /  AlkPhos  141<H>  -25    Vancomycin levels:   Cultures:     MEDICATIONS  (STANDING):    Ekg: Afib, no ischemic changes    A/P:    1. Left parietal subacute CVA:  CT head: no hemorrhage  c/w Xarelto/Statins  Dysphagia bedside screen failed with thin liquids   Puree diet, Speech and Swallow evaluation  NIHSS: 3    2. Afib: rate controlled  cw Lopressor  Xarelto    3. Htn:   cw ACEinh History of Present Illness:   89 y/o female with h/o Afib, Htn, HLD, Rt effusion,  L parietal CVA one week ago, discharge from the hospital 2 days ago on Xarelto, presents to the ED for worsening slurred speech and worsening L side weakness.   Patient left the hospital 2 days ago with mild LUE weakness; she didn't go to Rehab and wanted to be dc home. At home her symptoms were fluctuating and this am got worse so she was brought in the ED for evaluation.  On my exam patient has no aphasia,       PMHx: as above  PSHx: none  Family Hx: sister had AAA and  age 92  Social History: lives at home with son   no Etoh, no smoking            REVIEW OF SYSTEMS:    CONSTITUTIONAL: No weakness, No fevers or chills  ENT: No ear ache, No sorethroat  NECK: No pain, No stiffness  RESPIRATORY: No cough, No wheezing, No hemoptysis; No dyspnea  CARDIOVASCULAR: No chest pain, No palpitations  GASTROINTESTINAL: No abd pain, No nausea, No vomiting, No hematemesis, No diarrhea or constipation. No melena, No hematochezia.  GENITOURINARY: No dysuria, No  hematuria  NEUROLOGICAL: No diplopia, No paresthesia, + Left side motor weakness   MUSCULOSKELETAL: No arthralgia, No myalgia  SKIN: No rashes, or lesions   PSYCH: no anxiety, no suicidal ideation    All other review of systems is negative unless indicated above    Vital Signs Last 24 Hrs  T(C): 36.7 (25 Mar 2019 15:01), Max: 36.7 (25 Mar 2019 15:01)  T(F): 98.1 (25 Mar 2019 15:01), Max: 98.1 (25 Mar 2019 15:01)  HR: 80 (25 Mar 2019 15:01) (80 - 80)  BP: 167/63 (25 Mar 2019 15:01) (167/63 - 167/85)  BP(mean): --  RR: 16 (25 Mar 2019 15:01) (15 - 16)  SpO2: 96% (25 Mar 2019 15:01) (95% - 96%)    PHYSICAL EXAM:    GENERAL: NAD, Well nourished  HEENT:  NC/AT, EOMI, PERRLA, No scleral icterus, Moist mucous membranes  NECK: Supple, No JVD  CNS:  Alert & Oriented X3, LUE motor 4/5, LLE motor 4/5  LUNG: Normal Breath sounds, Clear to auscultation bilaterally, No rales, No rhonchi, No wheezing  HEART: RRR; No murmurs, No rubs  ABDOMEN: +BS, ST/ND/NT  GENITOURINARY: Voiding, Bladder not distended  EXTREMITIES:  2+ Peripheral Pulses, No clubbing, No cyanosis, No tibial edema  MUSCULOSKELTAL: Joints normal ROM, No TTP, No effusion  VAGINAL: deferred  SKIN: no rashes  RECTAL: deferred, not indicated  BREAST: deferred                          12.2   7.63  )-----------( 230      ( 25 Mar 2019 11:33 )             38.8     -    145  |  109<H>  |  17  ----------------------------<  129<H>  3.7   |  30  |  0.80    Ca    8.3<L>      25 Mar 2019 11:33  Phos  3.1     -  Mg     1.7     -    TPro  6.4  /  Alb  2.9<L>  /  TBili  1.2  /  DBili  x   /  AST  29  /  ALT  44  /  AlkPhos  141<H>  -25    Vancomycin levels:   Cultures:     MEDICATIONS  (STANDING):    Ekg: Afib, no ischemic changes    A/P:    1. Left parietal subacute CVA:  CT head: no hemorrhage  c/w Xarelto/Statins  Dysphagia bedside screen failed with thin liquids   Puree diet, Speech and Swallow evaluation  NIHSS: 3    2. Afib: rate controlled  cw Lopressor  Xarelto    3. Htn:   cw ACEinh     4. Moderate Rt pleural effusion:  patient not in distress, not hypoxic

## 2019-03-25 NOTE — PHYSICAL THERAPY INITIAL EVALUATION ADULT - GENERAL OBSERVATIONS, REHAB EVAL
Pt rec'd supine in bed in ED, family at bedside, pt pleasant and cooperative. Pt presents with right hemianopsia.

## 2019-03-25 NOTE — CONSULT NOTE ADULT - SUBJECTIVE AND OBJECTIVE BOX
Patient is a 89y old  Female who presents with a chief complaint of     HPI:  Pt is a 89 year old woman with HTN, TIA, mild dementia, recent atrial fibrillation and admission for stroke with left vision loss and left side weakness (3/21), presenting now for worsening of symptoms.      On 3/21 she was found down on floor by her son, and brought to hospital with NIHSS 5, no TPA bc outside of window. She was found to be in afib so placed on xarelto.  She had CT head without any acute abnormalities.  CTA head was normal and CTA neck with mild atherosclerosis of right vertebral artery. , A1c 6.3 in January. She takes atorvastatin 40mg. When home son noticed left side was progressively weaker and she was having trouble seeing from the left which fluctuated. Also some trouble swallowing solids but not liquids. No other changes specifically headache, word finding trouble, non sensical speech, double vision, fever, chills, headaches, numbness, tingling.      In ER here CT performed without hemorrhage and showing evolving right parietal stroke     PAST MEDICAL & SURGICAL HISTORY:  TIA (transient ischemic attack)  HTN (hypertension)  Osteopenia  Dementia  History of hip replacement  History of lumpectomy    FAMILY HISTORY:  Family history of early CAD (Father)    Social Hx: Nonsmoker, no drug or alcohol use    MEDICATIONS  (STANDING):  relevant are xarelto and atorvastatin 40mg    Allergies  No Known Allergies    Intolerances    ROS: Pertinent positives in HPI, all other ROS were reviewed and are negative.      Vital Signs Last 24 Hrs  T(C): 36.6 (25 Mar 2019 11:06), Max: 36.6 (25 Mar 2019 11:06)  T(F): 97.8 (25 Mar 2019 11:06), Max: 97.8 (25 Mar 2019 11:06)  HR: 80 (25 Mar 2019 11:06) (80 - 80)  BP: 167/85 (25 Mar 2019 11:06) (167/85 - 167/85)  BP(mean): --  RR: 15 (25 Mar 2019 11:06) (15 - 15)  SpO2: 95% (25 Mar 2019 11:06) (95% - 95%)      Constitutional: awake and alert.  HEENT: PERRLA, EOMI,   Neck: Supple.  Respiratory: Breath sounds are clear bilaterally  Cardiovascular: S1 and S2, regular rhythm  Gastrointestinal: soft, nontender  Extremities:  no edema  Vascular: Caritid Bruit - no  Musculoskeletal: no joint swelling  Skin: No rashes    Neurological exam:  HF: A x 2 (thinks 1919 but knows it is march). 3/3 immediate 0.3 delayed recall. Appropriately interactive, normal affect. Speech fluent, No Aphasia or paraphasic errors. Naming /repetition intact   CN: THAD, EOMI, left homonomous hemianopsia. Facial sensation normal, no NLFD, tongue midline, Palate moves equally, SCM equal bilaterally  Motor: pronator drift on left; left arm and leg with subtle 4-4+/5 weakness. Right arm and leg are strong   Sens: Intact to light touch, vibration, temperature   Reflexes: Symmetric and 1+ in arms, absent in legs   Coord:  No FNFA, dysmetria, JULIANE intact   Gait/Balance: Cannot test as falls risk     Labs:   03-25    145  |  109<H>  |  17  ----------------------------<  129<H>  3.7   |  30  |  0.80    Ca    8.3<L>      25 Mar 2019 11:33  Phos  3.1     03-25  Mg     1.7     03-25    TPro  6.4  /  Alb  2.9<L>  /  TBili  1.2  /  DBili  x   /  AST  29  /  ALT  44  /  AlkPhos  141<H>  03-25                              12.2   7.63  )-----------( 230      ( 25 Mar 2019 11:33 )             38.8     Radiology:  - CT Head 3/25 with  advanced periventricular and deep white matter ischemia with acute/ evolving cortical infarction in the medial RIGHT parietal lobe  - TTE 1/2019: EF 60-65%, LA mild dilated, RA mild dilated.   - CTA head 3/2019 no stenosis   - CTA neck 3/2019 no rate limiting stenosis with mild atherosclerosis right vertebral artery   -  (3/2019), A1c 6.3 (1/2019)    A/P:  This is a 89 year old woman with HTN, stroke (3/21 left side weakness and hemianopsia), mild dementia, HLD ( 3/2019), afib (xarelto), presenting for worsening of her stroke symptoms. CT head does not show an acute hemorrhage, but does show a parietal infarction, which is likely her original stroke, not seen on prior imaging as performed early in stroke time course. Also possible she had a second stroke, however her son thinks her symptoms are the same, but just fluctuating in severity (however L hemianopsia not noted on neuro exam from 3/21 -  but son says she had this with original symptoms). Since not clear if new stroke, and recently just started on xarelto, will not change to a different blood thinner now.  She is not safe at home now , son feels, because of her poor strength.      - Continue xarelto  - Continue atorvastatin 40mg ()  - TTE   - Ensure no infection sometimes UTI can cause recredecence or worsening stroke symptoms but patient asymptomatic in terms of infx now  - dysphagia screen/ swallow eval   - Physical therapy - not to be discharged until family feels safe taking her home

## 2019-03-25 NOTE — ED PROVIDER NOTE - OBJECTIVE STATEMENT
89yoF hx of TIA, HTN recently discharged for work up of TIA, now on xarelto, pw worsening confusion and weakness for past 1 day. described as not knowing names of objects, not looking in the right direction, general confusion and now inability to walk. Per family, they feel is worsened yesterday and now has not improved. Denies fevers, chills, nausea, vomiting, diarrhea, chest pain, numbness, tingling.   Patient unable to provide any HPI or ROS.

## 2019-03-25 NOTE — ED ADULT NURSE NOTE - OBJECTIVE STATEMENT
pt seen here for stroke, sent home on anti coag, as per family pt has increased confusion since d/c, with vision changes, and unable to ambulate on own.

## 2019-03-25 NOTE — ED ADULT NURSE NOTE - NSIMPLEMENTINTERV_GEN_ALL_ED
Implemented All Fall with Harm Risk Interventions:  [place] to call system. Call bell, personal items and telephone within reach. Instruct patient to call for assistance. Room bathroom lighting operational. Non-slip footwear when patient is off stretcher. Physically safe environment: no spills, clutter or unnecessary equipment. Stretcher in lowest position, wheels locked, appropriate side rails in place. Provide visual cue, wrist band, yellow gown, etc. Monitor gait and stability. Monitor for mental status changes and reorient to person, place, and time. Review medications for side effects contributing to fall risk. Reinforce activity limits and safety measures with patient and family. Provide visual clues: red socks.

## 2019-03-25 NOTE — ED ADULT NURSE REASSESSMENT NOTE - NS ED NURSE REASSESS COMMENT FT1
Received pt from previous nurse. Pt is A&Ox2/3. Pt is resting in her bed with family at bedside. Pt is weak on left side as same as she presented with to the ER. Pt doesn't present with any signs of distress. Safety and comfort measures in place, bed in lowest position, educated pt on how to use call bell, call bell within reach. Hourly rounding will be done on my time. Will continue to monitor.

## 2019-03-25 NOTE — ED PROVIDER NOTE - PROGRESS NOTE DETAILS
Joe العلي - Neurology consult called Dr. Lou who is aware. discussed results with patient and family at bedside. Trea SARMIENTO for attending Dr. Ny: 89 y/o female presenting with family for increasing weakness of left side. Pt awake with left sided weakness. agree with PGY 3 assessment and plan.

## 2019-03-26 LAB
CHOLEST SERPL-MCNC: 103 MG/DL — SIGNIFICANT CHANGE UP (ref 10–199)
CULTURE RESULTS: NO GROWTH — SIGNIFICANT CHANGE UP
HBA1C BLD-MCNC: 6.8 % — HIGH (ref 4–5.6)
HDLC SERPL-MCNC: 44 MG/DL — LOW
LIPID PNL WITH DIRECT LDL SERPL: 48 MG/DL — SIGNIFICANT CHANGE UP
SPECIMEN SOURCE: SIGNIFICANT CHANGE UP
TOTAL CHOLESTEROL/HDL RATIO MEASUREMENT: 2.3 RATIO — LOW (ref 3.3–7.1)
TRIGL SERPL-MCNC: 56 MG/DL — SIGNIFICANT CHANGE UP (ref 10–149)

## 2019-03-26 RX ORDER — LISINOPRIL 2.5 MG/1
10 TABLET ORAL DAILY
Qty: 0 | Refills: 0 | Status: DISCONTINUED | OUTPATIENT
Start: 2019-03-26 | End: 2019-03-28

## 2019-03-26 RX ORDER — HYDROCORTISONE 1 %
1 OINTMENT (GRAM) TOPICAL ONCE
Qty: 0 | Refills: 0 | Status: COMPLETED | OUTPATIENT
Start: 2019-03-26 | End: 2019-03-26

## 2019-03-26 RX ADMIN — Medication 81 MILLIGRAM(S): at 11:13

## 2019-03-26 RX ADMIN — RIVAROXABAN 15 MILLIGRAM(S): KIT at 18:25

## 2019-03-26 RX ADMIN — ATORVASTATIN CALCIUM 40 MILLIGRAM(S): 80 TABLET, FILM COATED ORAL at 21:18

## 2019-03-26 RX ADMIN — Medication 25 MILLIGRAM(S): at 06:54

## 2019-03-26 RX ADMIN — Medication 25 MILLIGRAM(S): at 18:25

## 2019-03-26 RX ADMIN — Medication 1 APPLICATION(S): at 08:59

## 2019-03-26 RX ADMIN — LISINOPRIL 2.5 MILLIGRAM(S): 2.5 TABLET ORAL at 06:54

## 2019-03-26 NOTE — SWALLOW BEDSIDE ASSESSMENT ADULT - DIET PRIOR TO ADMI
The patient was on a Regular Texture Diet with Thin Liquids when seen by this service during prior hospitalization last week.

## 2019-03-26 NOTE — CHART NOTE - NSCHARTNOTEFT_GEN_A_CORE
Pt c/o rash, worsening on her back and itchy. Pt states it started since she laid on the bed sheets.       Plan  -Hydrocortisone cream to affected area  -Monitor for worsening/ signs of contact dermatitis

## 2019-03-26 NOTE — PROGRESS NOTE ADULT - SUBJECTIVE AND OBJECTIVE BOX
HPI: patient feels well today without any complains. She is working with PT and in bright mood. Spoke with grand daughter at bedside. Relayed this is likely same stroke,. no way to tell if new one, but sx are similar to when she first presented. Will maintain on AC for stroke prevention and PT is best thing for her now. They will try rehab and then have a place fo  her to live with her son     ROS: 10 pt ROS otherwise negative.     MEDICATIONS  (STANDING):  aspirin enteric coated 81 milliGRAM(s) Oral daily  atorvastatin 40 milliGRAM(s) Oral at bedtime  lisinopril 2.5 milliGRAM(s) Oral daily  metoprolol tartrate 25 milliGRAM(s) Oral two times a day  rivaroxaban 15 milliGRAM(s) Oral every 24 hours      Vital Signs Last 24 Hrs  T(C): 36.6 (26 Mar 2019 05:12), Max: 36.9 (25 Mar 2019 18:41)  T(F): 97.8 (26 Mar 2019 05:12), Max: 98.5 (25 Mar 2019 20:00)  HR: 89 (26 Mar 2019 05:12) (75 - 89)  BP: 166/79 (26 Mar 2019 05:12) (136/64 - 180/61)  BP(mean): --  RR: 18 (26 Mar 2019 05:12) (15 - 18)  SpO2: 93% (26 Mar 2019 05:12) (93% - 96%)      Constitutional: awake and alert.  HEENT: PERRLA, EOMI  Neck: Supple.  Respiratory: Breath sounds are clear bilaterally  Cardiovascular: S1 and S2, regular rhythm  Gastrointestinal: soft, nontender  Extremities:  no edema  Musculoskeletal: no joint swelling  Skin: No rashes    Neurological exam:  HF: A x 2 March but not year. Appropriately interactive, normal affect. Speech fluent, No Aphasia or paraphasic errors.  CN: THAD, EOMI, left homonomous hemianopsia. Facial sensation normal, no NLFD, tongue midline, Palate moves equally, SCM equal bilaterally  Motor: pronator drift on left; left arm and leg with subtle 4/5 weakness; left leg 4+/5; right arm and leg are full strength.   Sens: Intact to light touch  Reflexes: Symmetric and 1+ in arms, absent in legs   Coord:  No FNFA, dysmetria (weakness appears as dysmetric but limited by her lack of strength)   Gait/Balance: walk with assistance                         12.2   7.63  )-----------( 230      ( 25 Mar 2019 11:33 )             38.8     03-25    145  |  109<H>  |  17  ----------------------------<  129<H>  3.7   |  30  |  0.80    Ca    8.3<L>      25 Mar 2019 11:33  Phos  3.1     03-25  Mg     1.7     03-25    TPro  6.4  /  Alb  2.9<L>  /  TBili  1.2  /  DBili  x   /  AST  29  /  ALT  44  /  AlkPhos  141<H>  03-25    Radiology:  - CT Head 3/25 with  advanced periventricular and deep white matter ischemia with acute/ evolving cortical infarction in the medial RIGHT parietal lobe  - TTE 1/2019: EF 60-65%, LA mild dilated, RA mild dilated.   - CTA head 3/2019 no stenosis   - CTA neck 3/2019 no rate limiting stenosis with mild atherosclerosis right vertebral artery   -  (3/2019), A1c 6.3 (1/2019)    A/P:  This is a 89 year old woman with HTN, stroke (3/21 left side weakness and hemianopsia), mild dementia, HLD ( 3/2019), afib (xarelto), presenting for worsening of her stroke symptoms. CTH without hemorrhage but with right parietal stroke (most likely evolution of old stroke rather than new stroke - as symptoms are similar) - however a new stroke is possible. Since just started on xarelto willl not change this medication now. Discussed this with her family. She will benefit from acute rehab.     - Continue xarelto  - Continue atorvastatin 40mg ()  - TTE   - Physical therapy now and d/c to acute rehab  - Neurology fu as outpatient     Reconsult neuro if further questions

## 2019-03-26 NOTE — SWALLOW BEDSIDE ASSESSMENT ADULT - SLP GENERAL OBSERVATIONS
On encounter, the pt was listing/leaning to the left at rest and a left homonymous hemianopsia was evident. The patient was awake but somewhat fatigued at times. She was interactive. The pt was able to verbalize during communicative probes and in conversation. Pt's verbalizations were intelligible, fluent, linguistically intact and contextually appropriate. Her motor speech and language abilities are within functional parameters. The pt was able to effectively verbalize her needs and is reportedly at communicative baseline.  Note that pt denied Dysphagia.

## 2019-03-26 NOTE — SWALLOW BEDSIDE ASSESSMENT ADULT - SWALLOW EVAL: FEEDING ASSISTANCE
PT WAS ABLE TO FEED SELF ON EXAM BUT HER LUE DEXTERITY AND A LEFT HOMONYMOUS HEMIANOPSIA WAS EVIDENT.  AS SUCH, SHE MAY BENEFIT FROM ASSISTANCE WITH TRAY SET UP/FEEDING AT TIMES. PT WAS ABLE TO FEED SELF ON EXAM BUT HER LUE DEXTERITY WAS REDUCED AND A LEFT HOMONYMOUS HEMIANOPSIA WAS EVIDENT.  AS SUCH, SHE MAY BENEFIT FROM ASSISTANCE WITH TRAY SET UP/FEEDING AT TIMES.

## 2019-03-26 NOTE — SWALLOW BEDSIDE ASSESSMENT ADULT - COMMENTS
The patient was admitted to  with lethargy, worsening left homonymous hemianopsia on the left and pronator drift on the lift. Head CT notable for suspected evolution of right Parietal CVA sustained last week. Note that she was recently hospitalized at this facility on 3/21/19 with left sided weakness and left homonymous hemianopsia at which time she was found to have sustained a right Parietal CVA. She was seen by speech pathology on 1/22/19 and no primary speech-language pathology or anyi overt Dysphagia process were evident.  Note that pt was also admitted to  in 1/19 with onset of Aphasia that resolved. A TIA was suspected. This profile is superimposed upon a history of dementia, osteopenia, HTN, HLD, A-Fib, past right pleural effusion, and prior hip surgery NOS. The patient was admitted to  with lethargy, worsening left homonymous hemianopsia on the left and pronator drift on the lift. Head CT notable for suspected evolution of right Parietal CVA sustained last week. Note that she was recently hospitalized at this facility on 3/21/19 with left sided weakness and left homonymous hemianopsia at which time she was found to have sustained a right Parietal CVA. She was seen by speech pathology on 1/22/19 and no primary speech-language pathology or anyi overt Dysphagia process were evident. Pt was subsequently D/C home.  Note that pt was also admitted to  in 1/19 with onset of Aphasia that resolved. A TIA was suspected at that time. This profile is superimposed upon a history of dementia, osteopenia, HTN, HLD, A-Fib, past right pleural effusion, and prior hip surgery NOS.

## 2019-03-26 NOTE — SWALLOW BEDSIDE ASSESSMENT ADULT - SWALLOW EVAL: DIAGNOSIS
1) The pt exhibits feeding compromise due to LUE incoordination/left homonymous hemianopsia which is atop Oropharyngeal Swallowing abilities which subjectively appear to be stable and within functional parameters for age when she is in an alert state.  NO behavioral aspiration signs exhibited on exam. Odynophagia was denied.   2) The patient was awake but somewhat fatigued. She was interactive. The pt was able to verbalize during communicative probes and in conversation. Pt's verbalizations were intelligible, fluent, linguistically intact and contextually appropriate. Her motor speech and language abilities are within functional parameters. The pt was able to effectively verbalize her needs when in an alert state and is reportedly at communicative baseline.

## 2019-03-26 NOTE — SWALLOW BEDSIDE ASSESSMENT ADULT - SWALLOW EVAL: RECOMMENDED DIET
SUGGEST A DASH/TLC REGULAR TEXTURE DIET WITH THIN LIQUID CONSISTENCIES AS SHE TOLERATES THESE FOOD TEXTURES FROM AN OROPHARYNGEAL SWALLOWING STANCE ON EXAM.

## 2019-03-26 NOTE — PROGRESS NOTE ADULT - SUBJECTIVE AND OBJECTIVE BOX
History of Present Illness:   89 y/o female with h/o Afib, Htn, HLD, Rt effusion,  L parietal CVA one week ago, discharge from the hospital 2 days ago on Xarelto, presents to the ED for worsening slurred speech and worsening L side weakness.   Patient left the hospital 2 days ago with mild LUE weakness; she didn't go to Rehab and wanted to be dc home. At home her symptoms were fluctuating and this am got worse so she was brought in the ED for evaluation.  On my exam patient has no aphasia,       PMHx: as above  PSHx: none  Family Hx: sister had AAA and  age 92  Social History: lives at home with son   no Etoh, no smoking            REVIEW OF SYSTEMS:    CONSTITUTIONAL: No weakness, No fevers or chills  ENT: No ear ache, No sorethroat  NECK: No pain, No stiffness  RESPIRATORY: No cough, No wheezing, No hemoptysis; No dyspnea  CARDIOVASCULAR: No chest pain, No palpitations  GASTROINTESTINAL: No abd pain, No nausea, No vomiting, No hematemesis, No diarrhea or constipation. No melena, No hematochezia.  GENITOURINARY: No dysuria, No  hematuria  NEUROLOGICAL: No diplopia, No paresthesia, + Left side motor weakness   MUSCULOSKELETAL: No arthralgia, No myalgia  SKIN: No rashes, or lesions   PSYCH: no anxiety, no suicidal ideation    All other review of systems is negative unless indicated above    Vital Signs Last 24 Hrs  T(C): 36.7 (25 Mar 2019 15:01), Max: 36.7 (25 Mar 2019 15:01)  T(F): 98.1 (25 Mar 2019 15:01), Max: 98.1 (25 Mar 2019 15:01)  HR: 80 (25 Mar 2019 15:01) (80 - 80)  BP: 167/63 (25 Mar 2019 15:01) (167/63 - 167/85)  BP(mean): --  RR: 16 (25 Mar 2019 15:01) (15 - 16)  SpO2: 96% (25 Mar 2019 15:01) (95% - 96%)    PHYSICAL EXAM:    GENERAL: NAD, Well nourished  HEENT:  NC/AT, EOMI, PERRLA, No scleral icterus, Moist mucous membranes  NECK: Supple, No JVD  CNS:  Alert & Oriented X3, LUE motor 4/5, LLE motor 4/5  LUNG: Normal Breath sounds, Clear to auscultation bilaterally, No rales, No rhonchi, No wheezing  HEART: RRR; No murmurs, No rubs  ABDOMEN: +BS, ST/ND/NT  GENITOURINARY: Voiding, Bladder not distended  EXTREMITIES:  2+ Peripheral Pulses, No clubbing, No cyanosis, No tibial edema  MUSCULOSKELTAL: Joints normal ROM, No TTP, No effusion  VAGINAL: deferred  SKIN: no rashes  RECTAL: deferred, not indicated  BREAST: deferred      Ekg: Afib, no ischemic changes    A/P:    1. Left parietal subacute CVA:  CT head: no hemorrhage  c/w Xarelto/Statins....will increase Lipitor to 40mg po qHS  Speech and Swallow evaluation noted; regular diet  NIHSS: 3  PT eval    2. Afib: rate controlled  cw Lopressor  Xarelto    3. Htn:   cw ACEinh     4. Moderate Rt pleural effusion:  patient not in distress, not hypoxic

## 2019-03-27 RX ORDER — INFLUENZA VIRUS VACCINE 15; 15; 15; 15 UG/.5ML; UG/.5ML; UG/.5ML; UG/.5ML
0.5 SUSPENSION INTRAMUSCULAR ONCE
Qty: 0 | Refills: 0 | Status: COMPLETED | OUTPATIENT
Start: 2019-03-27 | End: 2019-03-27

## 2019-03-27 RX ADMIN — LISINOPRIL 10 MILLIGRAM(S): 2.5 TABLET ORAL at 05:16

## 2019-03-27 RX ADMIN — Medication 25 MILLIGRAM(S): at 05:16

## 2019-03-27 RX ADMIN — ATORVASTATIN CALCIUM 40 MILLIGRAM(S): 80 TABLET, FILM COATED ORAL at 21:17

## 2019-03-27 RX ADMIN — RIVAROXABAN 15 MILLIGRAM(S): KIT at 18:38

## 2019-03-27 RX ADMIN — INFLUENZA VIRUS VACCINE 0.5 MILLILITER(S): 15; 15; 15; 15 SUSPENSION INTRAMUSCULAR at 11:37

## 2019-03-27 RX ADMIN — Medication 25 MILLIGRAM(S): at 18:38

## 2019-03-27 RX ADMIN — Medication 81 MILLIGRAM(S): at 11:26

## 2019-03-27 NOTE — PROGRESS NOTE ADULT - SUBJECTIVE AND OBJECTIVE BOX
History of Present Illness:   89 y/o female with h/o Afib, Htn, HLD, Rt effusion,  L parietal CVA one week ago, discharge from the hospital 2 days ago on Xarelto, presents to the ED for worsening slurred speech and worsening L side weakness.   Patient left the hospital 2 days ago with mild LUE weakness; she didn't go to Rehab and wanted to be dc home. At home her symptoms were fluctuating and this am got worse so she was brought in the ED for evaluation.  On my exam patient has no aphasia,       3.27: family present in the room; c/o mild LUE weakness  no slurred speech, no cp, no so         REVIEW OF SYSTEMS:    CONSTITUTIONAL: No weakness, No fevers or chills  ENT: No ear ache, No sorethroat  NECK: No pain, No stiffness  RESPIRATORY: No cough, No wheezing, No hemoptysis; No dyspnea  CARDIOVASCULAR: No chest pain, No palpitations  GASTROINTESTINAL: No abd pain, No nausea, No vomiting, No hematemesis, No diarrhea or constipation. No melena, No hematochezia.  GENITOURINARY: No dysuria, No  hematuria  NEUROLOGICAL: No diplopia, No paresthesia, + Left side motor weakness   MUSCULOSKELETAL: No arthralgia, No myalgia  SKIN: No rashes, or lesions   PSYCH: no anxiety, no suicidal ideation    All other review of systems is negative unless indicated above    Vital Signs Last 24 Hrs  T(C): 36.5 (27 Mar 2019 11:13), Max: 37.1 (27 Mar 2019 04:34)  T(F): 97.7 (27 Mar 2019 11:13), Max: 98.8 (27 Mar 2019 04:34)  HR: 85 (27 Mar 2019 11:13) (75 - 94)  BP: 145/61 (27 Mar 2019 11:13) (141/67 - 169/77)  RR: 19 (27 Mar 2019 11:13) (18 - 20)  SpO2: 96% (27 Mar 2019 11:13) (94% - 96%)  PHYSICAL EXAM:    GENERAL: NAD, Well nourished  HEENT:  NC/AT, EOMI, PERRLA, No scleral icterus, Moist mucous membranes  NECK: Supple, No JVD  CNS:  Alert & Oriented X3, LUE motor 4/5, LLE motor 4/5  LUNG: Normal Breath sounds, Clear to auscultation bilaterally, No rales, No rhonchi, No wheezing  HEART: RRR; No murmurs, No rubs  ABDOMEN: +BS, ST/ND/NT  GENITOURINARY: Voiding, Bladder not distended  EXTREMITIES:  2+ Peripheral Pulses, No clubbing, No cyanosis, No tibial edema  MUSCULOSKELTAL: Joints normal ROM, No TTP, No effusion  VAGINAL: deferred  SKIN: no rashes  RECTAL: deferred, not indicated  BREAST: deferred      Ekg: Afib, no ischemic changes    A/P:    1. Left parietal subacute CVA:  CT head: no hemorrhage  c/w Xarelto/Statins....will increase Lipitor to 40mg po qHS  Speech and Swallow evaluation noted; regular diet  NIHSS: 3  PT eval    2. Afib: rate controlled  cw Lopressor  Xarelto    3. Htn:   cw ACEinh, increased Lisinopril to 10mg qHS    4. Moderate Rt pleural effusion:  patient not in distress, not hypoxic

## 2019-03-28 ENCOUNTER — TRANSCRIPTION ENCOUNTER (OUTPATIENT)
Age: 84
End: 2019-03-28

## 2019-03-28 VITALS
TEMPERATURE: 99 F | OXYGEN SATURATION: 94 % | DIASTOLIC BLOOD PRESSURE: 53 MMHG | SYSTOLIC BLOOD PRESSURE: 134 MMHG | HEART RATE: 80 BPM | RESPIRATION RATE: 18 BRPM

## 2019-03-28 DIAGNOSIS — I10 ESSENTIAL (PRIMARY) HYPERTENSION: ICD-10-CM

## 2019-03-28 DIAGNOSIS — I63.9 CEREBRAL INFARCTION, UNSPECIFIED: ICD-10-CM

## 2019-03-28 DIAGNOSIS — Z86.73 PERSONAL HISTORY OF TRANSIENT ISCHEMIC ATTACK (TIA), AND CEREBRAL INFARCTION WITHOUT RESIDUAL DEFICITS: ICD-10-CM

## 2019-03-28 DIAGNOSIS — R47.81 SLURRED SPEECH: ICD-10-CM

## 2019-03-28 DIAGNOSIS — R73.03 PREDIABETES: ICD-10-CM

## 2019-03-28 DIAGNOSIS — Z53.09 PROCEDURE AND TREATMENT NOT CARRIED OUT BECAUSE OF OTHER CONTRAINDICATION: ICD-10-CM

## 2019-03-28 DIAGNOSIS — R29.705 NIHSS SCORE 5: ICD-10-CM

## 2019-03-28 DIAGNOSIS — M85.80 OTHER SPECIFIED DISORDERS OF BONE DENSITY AND STRUCTURE, UNSPECIFIED SITE: ICD-10-CM

## 2019-03-28 DIAGNOSIS — F03.90 UNSPECIFIED DEMENTIA WITHOUT BEHAVIORAL DISTURBANCE: ICD-10-CM

## 2019-03-28 DIAGNOSIS — R13.10 DYSPHAGIA, UNSPECIFIED: ICD-10-CM

## 2019-03-28 DIAGNOSIS — I48.2 CHRONIC ATRIAL FIBRILLATION: ICD-10-CM

## 2019-03-28 DIAGNOSIS — G81.94 HEMIPLEGIA, UNSPECIFIED AFFECTING LEFT NONDOMINANT SIDE: ICD-10-CM

## 2019-03-28 DIAGNOSIS — Z79.82 LONG TERM (CURRENT) USE OF ASPIRIN: ICD-10-CM

## 2019-03-28 DIAGNOSIS — Z96.649 PRESENCE OF UNSPECIFIED ARTIFICIAL HIP JOINT: ICD-10-CM

## 2019-03-28 RX ORDER — METOPROLOL TARTRATE 50 MG
1 TABLET ORAL
Qty: 0 | Refills: 0 | DISCHARGE
Start: 2019-03-28

## 2019-03-28 RX ORDER — ATORVASTATIN CALCIUM 80 MG/1
1 TABLET, FILM COATED ORAL
Qty: 0 | Refills: 0 | DISCHARGE
Start: 2019-03-28

## 2019-03-28 RX ORDER — LISINOPRIL 2.5 MG/1
1 TABLET ORAL
Qty: 0 | Refills: 0 | DISCHARGE
Start: 2019-03-28

## 2019-03-28 RX ORDER — RIVAROXABAN 15 MG-20MG
1 KIT ORAL
Qty: 0 | Refills: 0 | DISCHARGE
Start: 2019-03-28

## 2019-03-28 RX ADMIN — Medication 81 MILLIGRAM(S): at 12:04

## 2019-03-28 RX ADMIN — LISINOPRIL 10 MILLIGRAM(S): 2.5 TABLET ORAL at 05:21

## 2019-03-28 RX ADMIN — Medication 25 MILLIGRAM(S): at 05:21

## 2019-03-28 NOTE — DISCHARGE NOTE NURSING/CASE MANAGEMENT/SOCIAL WORK - NSDCPEPTSTRK_GEN_ALL_CORE
Stroke warning signs and symptoms/Signs and symptoms of stroke/Call 911 for stroke/Stroke support groups for patients, families, and friends/Prescribed medications/Need for follow up after discharge/Risk factors for stroke/Stroke education booklet

## 2019-03-28 NOTE — DISCHARGE NOTE PROVIDER - CARE PROVIDER_API CALL
Primary care MD,   Phone: (   )    -  Fax: (   )    -  Follow Up Time:     Neurology,   Phone: (   )    -  Fax: (   )    -  Follow Up Time:

## 2019-03-28 NOTE — DISCHARGE NOTE NURSING/CASE MANAGEMENT/SOCIAL WORK - NSDCDPATPORTLINK_GEN_ALL_CORE
You can access the [facility], offered by [facility], by registering with the following website: [email or web address]

## 2019-03-28 NOTE — DISCHARGE NOTE PROVIDER - PROVIDER TOKENS
FREE:[LAST:[Primary care MD],PHONE:[(   )    -],FAX:[(   )    -]],FREE:[LAST:[Neurology],PHONE:[(   )    -],FAX:[(   )    -]]

## 2019-03-28 NOTE — DISCHARGE NOTE PROVIDER - HOSPITAL COURSE
PCP        Patient is a 89y old  Female who presents with a chief complaint of             HPI:    History of Present Illness:     89 y/o female with h/o Afib, Htn, HLD, Rt effusion,  L parietal CVA one week ago, discharge from the hospital 2 days ago on Xarelto, presents to the ED for worsening slurred speech and worsening L side weakness.     Patient left the hospital 2 days ago with mild LUE weakness; she didn't go to Rehab and wanted to be dc home. At home her symptoms were fluctuating and this am got worse so she was brought in the ED for evaluation.    On my exam patient has no aphasia        3.28: LUE weakness improving , no cp, no sob, L eye decreased vision                     REVIEW OF SYSTEMS:        CONSTITUTIONAL: No weakness, No fevers or chills    ENT: No ear ache, No sorethroat    NECK: No pain, No stiffness    RESPIRATORY: No cough, No wheezing, No hemoptysis; No dyspnea    CARDIOVASCULAR: No chest pain, No palpitations    GASTROINTESTINAL: No abd pain, No nausea, No vomiting, No hematemesis, No diarrhea or constipation. No melena, No hematochezia.    GENITOURINARY: No dysuria, No  hematuria    NEUROLOGICAL: No diplopia, No paresthesia, + Left side motor weakness     MUSCULOSKELETAL: No arthralgia, No myalgia    SKIN: No rashes, or lesions     PSYCH: no anxiety, no suicidal ideation        All other review of systems is negative unless indicated above        Vital Signs Last 24 Hrs    T(C): 37.2 (28 Mar 2019 10:27), Max: 37.2 (28 Mar 2019 10:27)    T(F): 98.9 (28 Mar 2019 10:27), Max: 98.9 (28 Mar 2019 10:27)    HR: 80 (28 Mar 2019 10:27) (74 - 81)    BP: 134/53 (28 Mar 2019 10:27) (134/53 - 171/75)    RR: 18 (28 Mar 2019 10:27) (18 - 18)    SpO2: 94% (28 Mar 2019 10:27) (94% - 95%)        PHYSICAL EXAM:        GENERAL: NAD, Well nourished    HEENT:  NC/AT, EOMI, PERRLA, No scleral icterus, Moist mucous membranes    NECK: Supple, No JVD    CNS:  Alert & Oriented X3, LUE motor 4/5, LLE motor 5/5    LUNG: Normal Breath sounds, Clear to auscultation bilaterally, No rales, No rhonchi, No wheezing    HEART: RRR; No murmurs, No rubs    ABDOMEN: +BS, ST/ND/NT    GENITOURINARY: Voiding, Bladder not distended    EXTREMITIES:  2+ Peripheral Pulses, No clubbing, No cyanosis, No tibial edema    MUSCULOSKELTAL: Joints normal ROM, No TTP, No effusion    VAGINAL: deferred    SKIN: no rashes    RECTAL: deferred, not indicated    BREAST: deferred                         A/P:        1. Left parietal subacute CVA:    CT head: no hemorrhage    c/w Xarelto/Statins    Dysphagia bedside screen failed with thin liquids     Puree diet, Speech and Swallow evaluation    NIHSS: 2        2. Afib: rate controlled    cw Lopressor    Xarelto        3. Htn: stable    cw ACEinh

## 2019-03-28 NOTE — DISCHARGE NOTE PROVIDER - NSDCCPCAREPLAN_GEN_ALL_CORE_FT
PRINCIPAL DISCHARGE DIAGNOSIS  Diagnosis: CVA (cerebral vascular accident)  Assessment and Plan of Treatment: c/w antiplatelets, xarelta, statins, c/w rehabilitation program

## 2019-03-30 LAB
CULTURE RESULTS: SIGNIFICANT CHANGE UP
CULTURE RESULTS: SIGNIFICANT CHANGE UP
SPECIMEN SOURCE: SIGNIFICANT CHANGE UP
SPECIMEN SOURCE: SIGNIFICANT CHANGE UP

## 2019-04-03 DIAGNOSIS — G81.94 HEMIPLEGIA, UNSPECIFIED AFFECTING LEFT NONDOMINANT SIDE: ICD-10-CM

## 2019-04-03 DIAGNOSIS — H53.462 HOMONYMOUS BILATERAL FIELD DEFECTS, LEFT SIDE: ICD-10-CM

## 2019-04-03 DIAGNOSIS — Z79.82 LONG TERM (CURRENT) USE OF ASPIRIN: ICD-10-CM

## 2019-04-03 DIAGNOSIS — I63.9 CEREBRAL INFARCTION, UNSPECIFIED: ICD-10-CM

## 2019-04-03 DIAGNOSIS — I10 ESSENTIAL (PRIMARY) HYPERTENSION: ICD-10-CM

## 2019-04-03 DIAGNOSIS — I48.91 UNSPECIFIED ATRIAL FIBRILLATION: ICD-10-CM

## 2019-04-03 DIAGNOSIS — Z86.73 PERSONAL HISTORY OF TRANSIENT ISCHEMIC ATTACK (TIA), AND CEREBRAL INFARCTION WITHOUT RESIDUAL DEFICITS: ICD-10-CM

## 2019-04-03 DIAGNOSIS — R47.81 SLURRED SPEECH: ICD-10-CM

## 2019-04-03 DIAGNOSIS — J90 PLEURAL EFFUSION, NOT ELSEWHERE CLASSIFIED: ICD-10-CM

## 2019-04-03 DIAGNOSIS — F03.90 UNSPECIFIED DEMENTIA WITHOUT BEHAVIORAL DISTURBANCE: ICD-10-CM

## 2019-04-03 DIAGNOSIS — E78.5 HYPERLIPIDEMIA, UNSPECIFIED: ICD-10-CM

## 2019-04-03 DIAGNOSIS — Z79.01 LONG TERM (CURRENT) USE OF ANTICOAGULANTS: ICD-10-CM

## 2019-05-31 ENCOUNTER — INPATIENT (INPATIENT)
Facility: HOSPITAL | Age: 84
LOS: 4 days | Discharge: NURSING, REHAB OR LONG-TERM CARE | End: 2019-06-05
Attending: INTERNAL MEDICINE | Admitting: INTERNAL MEDICINE
Payer: MEDICARE

## 2019-05-31 VITALS — WEIGHT: 139.99 LBS | HEIGHT: 65 IN

## 2019-05-31 DIAGNOSIS — Z98.890 OTHER SPECIFIED POSTPROCEDURAL STATES: Chronic | ICD-10-CM

## 2019-05-31 DIAGNOSIS — Z96.649 PRESENCE OF UNSPECIFIED ARTIFICIAL HIP JOINT: Chronic | ICD-10-CM

## 2019-05-31 LAB
ALBUMIN SERPL ELPH-MCNC: 2.8 G/DL — LOW (ref 3.3–5)
ALLERGY+IMMUNOLOGY DIAG STUDY NOTE: SIGNIFICANT CHANGE UP
ALP SERPL-CCNC: 132 U/L — HIGH (ref 40–120)
ALT FLD-CCNC: 58 U/L — SIGNIFICANT CHANGE UP (ref 12–78)
ANION GAP SERPL CALC-SCNC: 7 MMOL/L — SIGNIFICANT CHANGE UP (ref 5–17)
APPEARANCE UR: CLEAR — SIGNIFICANT CHANGE UP
APTT BLD: 37.7 SEC — HIGH (ref 27.5–36.3)
AST SERPL-CCNC: 37 U/L — SIGNIFICANT CHANGE UP (ref 15–37)
BASOPHILS # BLD AUTO: 0.08 K/UL — SIGNIFICANT CHANGE UP (ref 0–0.2)
BASOPHILS NFR BLD AUTO: 1.3 % — SIGNIFICANT CHANGE UP (ref 0–2)
BILIRUB SERPL-MCNC: 0.5 MG/DL — SIGNIFICANT CHANGE UP (ref 0.2–1.2)
BILIRUB UR-MCNC: NEGATIVE — SIGNIFICANT CHANGE UP
BUN SERPL-MCNC: 19 MG/DL — SIGNIFICANT CHANGE UP (ref 7–23)
CALCIUM SERPL-MCNC: 8.9 MG/DL — SIGNIFICANT CHANGE UP (ref 8.5–10.1)
CHLORIDE SERPL-SCNC: 107 MMOL/L — SIGNIFICANT CHANGE UP (ref 96–108)
CO2 SERPL-SCNC: 28 MMOL/L — SIGNIFICANT CHANGE UP (ref 22–31)
COLOR SPEC: YELLOW — SIGNIFICANT CHANGE UP
CREAT SERPL-MCNC: 0.75 MG/DL — SIGNIFICANT CHANGE UP (ref 0.5–1.3)
DIFF PNL FLD: NEGATIVE — SIGNIFICANT CHANGE UP
EOSINOPHIL # BLD AUTO: 0.08 K/UL — SIGNIFICANT CHANGE UP (ref 0–0.5)
EOSINOPHIL NFR BLD AUTO: 1.3 % — SIGNIFICANT CHANGE UP (ref 0–6)
GLUCOSE SERPL-MCNC: 188 MG/DL — HIGH (ref 70–99)
GLUCOSE UR QL: NEGATIVE MG/DL — SIGNIFICANT CHANGE UP
HCT VFR BLD CALC: 32.7 % — LOW (ref 34.5–45)
HGB BLD-MCNC: 10.1 G/DL — LOW (ref 11.5–15.5)
IMM GRANULOCYTES NFR BLD AUTO: 0.5 % — SIGNIFICANT CHANGE UP (ref 0–1.5)
INR BLD: 2.27 RATIO — HIGH (ref 0.88–1.16)
KETONES UR-MCNC: NEGATIVE — SIGNIFICANT CHANGE UP
LACTATE SERPL-SCNC: 1.1 MMOL/L — SIGNIFICANT CHANGE UP (ref 0.7–2)
LACTATE SERPL-SCNC: 2.2 MMOL/L — HIGH (ref 0.7–2)
LEUKOCYTE ESTERASE UR-ACNC: NEGATIVE — SIGNIFICANT CHANGE UP
LIDOCAIN IGE QN: 39 U/L — LOW (ref 73–393)
LYMPHOCYTES # BLD AUTO: 1.07 K/UL — SIGNIFICANT CHANGE UP (ref 1–3.3)
LYMPHOCYTES # BLD AUTO: 17.1 % — SIGNIFICANT CHANGE UP (ref 13–44)
MCHC RBC-ENTMCNC: 30.8 PG — SIGNIFICANT CHANGE UP (ref 27–34)
MCHC RBC-ENTMCNC: 30.9 GM/DL — LOW (ref 32–36)
MCV RBC AUTO: 99.7 FL — SIGNIFICANT CHANGE UP (ref 80–100)
MONOCYTES # BLD AUTO: 0.56 K/UL — SIGNIFICANT CHANGE UP (ref 0–0.9)
MONOCYTES NFR BLD AUTO: 9 % — SIGNIFICANT CHANGE UP (ref 2–14)
NEUTROPHILS # BLD AUTO: 4.43 K/UL — SIGNIFICANT CHANGE UP (ref 1.8–7.4)
NEUTROPHILS NFR BLD AUTO: 70.8 % — SIGNIFICANT CHANGE UP (ref 43–77)
NITRITE UR-MCNC: NEGATIVE — SIGNIFICANT CHANGE UP
PH UR: 6 — SIGNIFICANT CHANGE UP (ref 5–8)
PLATELET # BLD AUTO: 183 K/UL — SIGNIFICANT CHANGE UP (ref 150–400)
POTASSIUM SERPL-MCNC: 4 MMOL/L — SIGNIFICANT CHANGE UP (ref 3.5–5.3)
POTASSIUM SERPL-SCNC: 4 MMOL/L — SIGNIFICANT CHANGE UP (ref 3.5–5.3)
PROT SERPL-MCNC: 6 GM/DL — SIGNIFICANT CHANGE UP (ref 6–8.3)
PROT UR-MCNC: NEGATIVE MG/DL — SIGNIFICANT CHANGE UP
PROTHROM AB SERPL-ACNC: 25.9 SEC — HIGH (ref 10–12.9)
RBC # BLD: 3.28 M/UL — LOW (ref 3.8–5.2)
RBC # FLD: 15.9 % — HIGH (ref 10.3–14.5)
SODIUM SERPL-SCNC: 142 MMOL/L — SIGNIFICANT CHANGE UP (ref 135–145)
SP GR SPEC: 1.01 — SIGNIFICANT CHANGE UP (ref 1.01–1.02)
TROPONIN I SERPL-MCNC: <0.015 NG/ML — SIGNIFICANT CHANGE UP (ref 0.01–0.04)
UROBILINOGEN FLD QL: NEGATIVE MG/DL — SIGNIFICANT CHANGE UP
WBC # BLD: 6.25 K/UL — SIGNIFICANT CHANGE UP (ref 3.8–10.5)
WBC # FLD AUTO: 6.25 K/UL — SIGNIFICANT CHANGE UP (ref 3.8–10.5)

## 2019-05-31 PROCEDURE — 76376 3D RENDER W/INTRP POSTPROCES: CPT | Mod: 26

## 2019-05-31 PROCEDURE — 74177 CT ABD & PELVIS W/CONTRAST: CPT | Mod: 26

## 2019-05-31 PROCEDURE — 72125 CT NECK SPINE W/O DYE: CPT | Mod: 26

## 2019-05-31 PROCEDURE — 99285 EMERGENCY DEPT VISIT HI MDM: CPT

## 2019-05-31 PROCEDURE — 73110 X-RAY EXAM OF WRIST: CPT | Mod: 26,LT

## 2019-05-31 PROCEDURE — 70450 CT HEAD/BRAIN W/O DYE: CPT | Mod: 26

## 2019-05-31 PROCEDURE — 71260 CT THORAX DX C+: CPT | Mod: 26

## 2019-05-31 PROCEDURE — 71045 X-RAY EXAM CHEST 1 VIEW: CPT | Mod: 26

## 2019-05-31 PROCEDURE — 70486 CT MAXILLOFACIAL W/O DYE: CPT | Mod: 26

## 2019-05-31 PROCEDURE — 99203 OFFICE O/P NEW LOW 30 MIN: CPT

## 2019-05-31 PROCEDURE — 93010 ELECTROCARDIOGRAM REPORT: CPT

## 2019-05-31 RX ORDER — SODIUM CHLORIDE 9 MG/ML
1000 INJECTION INTRAMUSCULAR; INTRAVENOUS; SUBCUTANEOUS ONCE
Refills: 0 | Status: COMPLETED | OUTPATIENT
Start: 2019-05-31 | End: 2019-05-31

## 2019-05-31 RX ORDER — ONDANSETRON 8 MG/1
4 TABLET, FILM COATED ORAL EVERY 6 HOURS
Refills: 0 | Status: DISCONTINUED | OUTPATIENT
Start: 2019-05-31 | End: 2019-06-05

## 2019-05-31 RX ORDER — ALPRAZOLAM 0.25 MG
0.25 TABLET ORAL AT BEDTIME
Refills: 0 | Status: DISCONTINUED | OUTPATIENT
Start: 2019-05-31 | End: 2019-06-02

## 2019-05-31 RX ORDER — ACETAMINOPHEN 500 MG
650 TABLET ORAL EVERY 6 HOURS
Refills: 0 | Status: DISCONTINUED | OUTPATIENT
Start: 2019-05-31 | End: 2019-06-05

## 2019-05-31 RX ORDER — SODIUM CHLORIDE 9 MG/ML
1000 INJECTION INTRAMUSCULAR; INTRAVENOUS; SUBCUTANEOUS
Refills: 0 | Status: DISCONTINUED | OUTPATIENT
Start: 2019-05-31 | End: 2019-06-01

## 2019-05-31 RX ORDER — ATORVASTATIN CALCIUM 80 MG/1
40 TABLET, FILM COATED ORAL AT BEDTIME
Refills: 0 | Status: DISCONTINUED | OUTPATIENT
Start: 2019-05-31 | End: 2019-06-05

## 2019-05-31 RX ORDER — METOPROLOL TARTRATE 50 MG
25 TABLET ORAL
Refills: 0 | Status: DISCONTINUED | OUTPATIENT
Start: 2019-05-31 | End: 2019-06-05

## 2019-05-31 RX ORDER — LISINOPRIL 2.5 MG/1
10 TABLET ORAL DAILY
Refills: 0 | Status: DISCONTINUED | OUTPATIENT
Start: 2019-05-31 | End: 2019-06-05

## 2019-05-31 RX ORDER — ROSUVASTATIN CALCIUM 5 MG/1
1 TABLET ORAL
Qty: 0 | Refills: 0 | DISCHARGE

## 2019-05-31 RX ORDER — MIDAZOLAM HYDROCHLORIDE 1 MG/ML
5 INJECTION, SOLUTION INTRAMUSCULAR; INTRAVENOUS ONCE
Refills: 0 | Status: DISCONTINUED | OUTPATIENT
Start: 2019-05-31 | End: 2019-05-31

## 2019-05-31 RX ADMIN — Medication 1 MILLIGRAM(S): at 14:05

## 2019-05-31 RX ADMIN — ATORVASTATIN CALCIUM 40 MILLIGRAM(S): 80 TABLET, FILM COATED ORAL at 21:47

## 2019-05-31 RX ADMIN — Medication 0.25 MILLIGRAM(S): at 21:46

## 2019-05-31 RX ADMIN — Medication 25 MILLIGRAM(S): at 21:47

## 2019-05-31 RX ADMIN — LISINOPRIL 10 MILLIGRAM(S): 2.5 TABLET ORAL at 21:46

## 2019-05-31 RX ADMIN — SODIUM CHLORIDE 1000 MILLILITER(S): 9 INJECTION INTRAMUSCULAR; INTRAVENOUS; SUBCUTANEOUS at 14:05

## 2019-05-31 RX ADMIN — SODIUM CHLORIDE 1000 MILLILITER(S): 9 INJECTION INTRAMUSCULAR; INTRAVENOUS; SUBCUTANEOUS at 15:05

## 2019-05-31 NOTE — CONSULT NOTE ADULT - ATTENDING COMMENTS
89F with dementia on ASA/Xarelto, unwitnessed fall from bed, no LOC.  seen and examined 05-31-19 @ 1650.    CT head / c-spine - no injury  CT chest / abd/pelvis w/ contrast - small right pleural effusion, trace left pleural effusion, grade 2 subcapsular splenic hematoma      low-grade splenic injury  -no evidence of ongoing bleeding given delayed presentation, stable vital signs, and no metabolic acidosis  -hold ASA/Xarelto for 10 days  -reconsider risks vs benefit of anticoagulation in this elderly female with dementia    bilateral pleural effusions  -low Hounsfield units and no rib fractures suggests against traumatic hemothorax

## 2019-05-31 NOTE — H&P ADULT - ASSESSMENT
89 y.o. 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 Pt is poor historian due to dementia, who denies any pain, fever, chills, CP, SOB, abd pain, dysuria, diarrhea, constipation. Denies any symptoms and denies any past surgeries. Discussed with pt's son, Ector, who reports that pt was seen on the floor around 2am today on the monitor. Pt has a gate around the bed and family was unsure how she managed to climb over the gate and fell. Pt lives with grandson and grand daughter in law.    #s/p fall  -pt was eval by trauma and cleared from their service  -cont to trend H/H  -hold ASA, xarelto per trauma service  -anticoagulation service consult to eval for R/B of continuation of xarelto  -PT eval    #AFib  -hold xarelto, asa  -cont BB    #dementia  -supportive care  -fall precaution    #DVT ppx  -SCDs    #advanced care planning  -discussed with son Ector Jaramillo [phone] regarding code status/advance directives. Pt had MOLST form signed in the past with DNR/DNI, but when she went to rehab at [facility], he didn't get the form back  -Another MOLST form completed verbally, and in chart  -pt is DNR/DNI  -time: 20 min    IMPROVE VTE Individual Risk Assessment    RISK                                                                Points    [  ] Previous VTE                                                  3    [  ] Thrombophilia                                               2    [  ] Lower limb paralysis                                      2        (unable to hold up >15 seconds)      [  ] Current Cancer                                              2         (within 6 months)    [ x ] Immobilization > 24 hrs                                1    [  ] ICU/CCU stay > 24 hours                              1    [ x ] Age > 60                                                      1    IMPROVE VTE Score _____2____    IMPROVE Score 0-1: Low Risk, No VTE prophylaxis required for most patients, encourage ambulation.   IMPROVE Score 2-3: At risk, pharmacologic VTE prophylaxis is indicated for most patients (in the absence of a contraindication)  IMPROVE Score > or = 4: High Risk, pharmacologic VTE prophylaxis is indicated for most patients (in the absence of a contraindication) 89 y.o. 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 Pt is poor historian due to dementia, who denies any pain, fever, chills, CP, SOB, abd pain, dysuria, diarrhea, constipation. Denies any symptoms and denies any past surgeries. Discussed with pt's son, Ector, who reports that pt was seen on the floor around 2am today on the monitor. Pt has a gate around the bed and family was unsure how she managed to climb over the gate and fell. Pt lives with grandson and grand daughter in law.    #s/p fall  -pt was eval by trauma and cleared from their service  -cont to trend H/H  -hold ASA, xarelto per trauma service  -anticoagulation service consult to eval for R/B of continuation of xarelto  -PT eval    #AFib  #age indeterminate thin subcapsular splenic collection  -hold xarelto, asa  -cont BB    #dementia  -supportive care  -fall precaution    #DVT ppx  -SCDs    #advanced care planning  -discussed with son Ector Jaramillo [phone] regarding code status/advance directives. Pt had MOLST form signed in the past with DNR/DNI, but when she went to rehab at [facility], he didn't get the form back  -Another MOLST form completed verbally, and in chart  -pt is DNR/DNI  -time: 20 min    IMPROVE VTE Individual Risk Assessment    RISK                                                                Points    [  ] Previous VTE                                                  3    [  ] Thrombophilia                                               2    [  ] Lower limb paralysis                                      2        (unable to hold up >15 seconds)      [  ] Current Cancer                                              2         (within 6 months)    [ x ] Immobilization > 24 hrs                                1    [  ] ICU/CCU stay > 24 hours                              1    [ x ] Age > 60                                                      1    IMPROVE VTE Score _____2____    IMPROVE Score 0-1: Low Risk, No VTE prophylaxis required for most patients, encourage ambulation.   IMPROVE Score 2-3: At risk, pharmacologic VTE prophylaxis is indicated for most patients (in the absence of a contraindication)  IMPROVE Score > or = 4: High Risk, pharmacologic VTE prophylaxis is indicated for most patients (in the absence of a contraindication) 89 y.o. 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 Pt is poor historian due to dementia, who denies any pain, fever, chills, CP, SOB, abd pain, dysuria, diarrhea, constipation. Denies any symptoms and denies any past surgeries. Discussed with pt's son, Ector, who reports that pt was seen on the floor around 2am today on the monitor. Pt has a gate around the bed and family was unsure how she managed to climb over the gate and fell. Pt lives with grandson and grand daughter in law.    #s/p fall  -pt was eval by trauma and cleared from their service  -cont to trend H/H  -hold ASA, xarelto per trauma service  -anticoagulation service consult to eval for R/B of continuation of xarelto  -PT eval    #AFib, hx CVA  #age indeterminate thin subcapsular splenic collection  -hold xarelto, asa  -cont BB    #dementia  -supportive care  -fall precaution    #DVT ppx  -SCDs    #advanced care planning  -discussed with son Ector Jaramillo [phone] regarding code status/advance directives. Pt had MOLST form signed in the past with DNR/DNI, but when she went to rehab at [facility], he didn't get the form back  -Another MOLST form completed verbally, and in chart  -pt is DNR/DNI  -time: 20 min    IMPROVE VTE Individual Risk Assessment    RISK                                                                Points    [  ] Previous VTE                                                  3    [  ] Thrombophilia                                               2    [  ] Lower limb paralysis                                      2        (unable to hold up >15 seconds)      [  ] Current Cancer                                              2         (within 6 months)    [ x ] Immobilization > 24 hrs                                1    [  ] ICU/CCU stay > 24 hours                              1    [ x ] Age > 60                                                      1    IMPROVE VTE Score _____2____    IMPROVE Score 0-1: Low Risk, No VTE prophylaxis required for most patients, encourage ambulation.   IMPROVE Score 2-3: At risk, pharmacologic VTE prophylaxis is indicated for most patients (in the absence of a contraindication)  IMPROVE Score > or = 4: High Risk, pharmacologic VTE prophylaxis is indicated for most patients (in the absence of a contraindication)

## 2019-05-31 NOTE — ED PROVIDER NOTE - CONSTITUTIONAL, MLM
normal... Pt examined s/p IV medication so as to allow CT to be done. Pt presenting lethargic, arousal to stimuli, alert, and in no apparent distress.

## 2019-05-31 NOTE — CONSULT NOTE ADULT - ASSESSMENT
89F s/p fall, on xarelto and ASA, ct findings showing Spelnic fluid collection, likely hematoma    -hold asa/eliquis, and any OAC  -Serial cbc x 6 hrs x 3  -monitor vitals  -no acute trauma intervention at this time      d/w Dr Mills

## 2019-05-31 NOTE — ED PROVIDER NOTE - SKIN, MLM
+left forehead and left distal forearm with ecchymosis left wrist nontender no effusion, AFROM, normal radial pulse

## 2019-05-31 NOTE — ED ADULT NURSE NOTE - OBJECTIVE STATEMENT
Pt BIBA after unwitnessed fall.  Trauma alert initiated on arrival.  Pt confused, combative; calling for son.  Bruising to left side of forehead, left wrist.  Hair clips and watch given to daughter in law.  20g PIV placed in RFA. Cardiac and VS monitoring initiated.  Ativan given prior to CT scan.  Pt cooperative and smiling later with son at bedside.  Straight cath for urine specimen without difficulty.

## 2019-05-31 NOTE — ED PROVIDER NOTE - OBJECTIVE STATEMENT
89 y/o female with a PMHx of Afib on Xarelto, HTN, HLD, dementia, Rt effusion, L parietal CVA in March 2019 presents to the ED s/p fall yesterday with head injury. Pt lives with grandson and his wife. They keep a camera in her room and noticed that pt was laying on floor next to bed through camera. They are unsure how pt slipped through gate. Pt's son at bedside noticed new left forehead bruising and left distal forearm bruising. Son saw pt this morning who said she was acting at baseline. However, pt's aid reported to son that pt was refusing to take pills and eat breakfast. Pt's speech therapist and physical therapist went to pt's home for routine treatment and noticed bruising and told to go to ED for evaluation due to pt's use of blood thinners. Trauma alert initiated upon arrival to ED. HPI as per pt's son at bedside. Unable to obtain full HPI secondary to pt's dementia. PMD: Dr. Norwood, Cardio- Dr. Godinez

## 2019-05-31 NOTE — H&P ADULT - NSHPPHYSICALEXAM_GEN_ALL_CORE
Vital Signs Last 24 Hrs  T(C): 36.7 (31 May 2019 17:41), Max: 36.7 (31 May 2019 14:02)  T(F): 98.1 (31 May 2019 17:41), Max: 98.1 (31 May 2019 17:41)  HR: 65 (31 May 2019 17:41) (65 - 75)  BP: 139/75 (31 May 2019 17:41) (139/75 - 149/52)  BP(mean): --  RR: 18 (31 May 2019 17:41) (18 - 18)  SpO2: 93% (31 May 2019 17:41) (93% - 93%)    GEN: appears comfortable  Neuro: Alert, conversant, but calls out son's name  HEENT: left frontal forehead bruise, EOMI  Neck: no thyroidmegaly, no JVD  Cardiovascular: S1S2 present, irregularly irregular rhythm, no murmur  Respiratory: breath sounds normal bilaterally, no wheezing, no rales, no rhonchi  Gastrointestinal: bowel sounds normal, soft, no abdominal tenderness  Musculoskeletal: no muscle tenderness  Extremities: No edema  Skin: No rash

## 2019-05-31 NOTE — ED PROVIDER NOTE - CLINICAL SUMMARY MEDICAL DECISION MAKING FREE TEXT BOX
89 y/o female multiple PMHx including dementia, HTN, AFib on Xarelto and beta blocker BIBA from home for eval. s/p fall out of bed last night w/ bruising left forehead and left wrist. Pt denies acute complaints. Family reports pt refusing breakfast and initially refused morning meds. Plan trauma alert initiated, PAN scan, X-Ray left wrist, CXR, EKG, labs, IV fluids, monitor, observe, and reassess. Pt required 1 dose of Ativan in order for CT studies to be done.

## 2019-05-31 NOTE — H&P ADULT - NSICDXPASTMEDICALHX_GEN_ALL_CORE_FT
PAST MEDICAL HISTORY:  Dementia     HTN (hypertension)     Osteopenia     TIA (transient ischemic attack)

## 2019-05-31 NOTE — ED PROVIDER NOTE - NEUROLOGICAL, MLM
+lethargic s/p IV ativan, able to arouse to verbal stimuli, cooperate with exam, answers appropriately, CN's II-XII intact

## 2019-05-31 NOTE — CONSULT NOTE ADULT - SUBJECTIVE AND OBJECTIVE BOX
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 Localized to the left side with left head injury left forearm bruise.. Trauma alert initiated upon arrival to ED. HPI as per pt's son at bedside. Unable to obtain full HPI secondary to pt's dementia.    Primary survey intact  secondary survey'  gen alert awake  head: bruiseing on left forehead region  otherwise NCAAT  EOMI  neck supple no jvd  cardiac s1 s2 rr  lungs clear  abd soft nt nd no ttp   ext no edema b/l  back no stepoff no midline tenderness  ext left forearm       ICU Vital Signs Last 24 Hrs  T(C): 36.7 (31 May 2019 14:02), Max: 36.7 (31 May 2019 14:02)  T(F): 98 (31 May 2019 14:02), Max: 98 (31 May 2019 14:02)  HR: 75 (31 May 2019 14:02) (75 - 75)  BP: 149/52 (31 May 2019 14:02) (149/52 - 149/52)  BP(mean): --  ABP: --  ABP(mean): --  RR: --  SpO2: --                            10.1   6.25  )-----------( 183      ( 31 May 2019 14:58 )             32.7   05-31    142  |  107  |  19  ----------------------------<  188<H>  4.0   |  28  |  0.75    Ca    8.9      31 May 2019 14:58    TPro  6.0  /  Alb  2.8<L>  /  TBili  0.5  /  DBili  x   /  AST  37  /  ALT  58  /  AlkPhos  132<H>  05-31

## 2019-05-31 NOTE — ED PROVIDER NOTE - PROGRESS NOTE DETAILS
TA, risks outweight benefits OK to push contrast before labs. -Carlos Manuel Griffin PA-C Tera SARMIENTO for attending Dr. Weiner: CT chest positive for thin subcapsular splenic collection may represent an age-indeterminate hematoma. EDPA discussing with trauma team for official consult. eTra SARMIENTO for attending Dr. Weiner: spoke with surgery who will come evaluate pt. Patient evaluated by surgery, recommends observation for 24 hours with serial H/H.   Case discussed with Dr Christiana duff who will admit pt. -Carlos Manuel Griffin PA-C

## 2019-05-31 NOTE — ED PROVIDER NOTE - UNABLE TO OBTAIN
Unable to obtain full HPI secondary to pt's dementia. Dementia Unable to obtain full ROS secondary to pt's dementia. Unable to obtain full HPI secondary to pt's dementia.  Hx obtained from son, pt.

## 2019-05-31 NOTE — ED ADULT NURSE REASSESSMENT NOTE - NS ED NURSE REASSESS COMMENT FT1
Assumed care of patient from Sandra PAEZ RN at 1915. Patient AxOx1. Patient resting comfortably in bed. Patient has dinner tray in front of her, offered to feed patient pt denied, offered to open food and pt denied. Pt does not want to eat at this time. Patient asked if wet pt denies, checked pt and diaper dry at this time. Patient in no acute signs of distress. All needs addressed at this time. Safety and comfort maintained. Will continue to monitor.

## 2019-05-31 NOTE — ED PROVIDER NOTE - ATTENDING CONTRIBUTION TO CARE
I, Mitch Weiner MD, personally saw the patient with the PA, and completed the key components of the history and physical exam. I then discussed the management plan with the PA.

## 2019-05-31 NOTE — ED ADULT NURSE REASSESSMENT NOTE - NS ED NURSE REASSESS COMMENT FT1
Patient A&Ox4, resting comfortably in bed. Denies SOB, no s/s of distress present. Infrequent moist cough, pt unable to expectorate sputum. SpO2 95% on room air. No s/s of distress present. VSS, denies pain/discomfort. Plan of care discussed, wait time explained. Safety & comfort measures in place, spouse bedside. Purposeful active rounding completed.

## 2019-05-31 NOTE — ED ADULT NURSE REASSESSMENT NOTE - NS ED NURSE REASSESS COMMENT FT1
Patient alert, oriented to name only. Resting in bed, repositioned for comfort & safety. Ecchymosis to left forehead and left forearm noted, radial pulse palpable. No s/s of distress present. Reoriented as needed, plan of care explained. Safety & comfort measures in place, purposeful active rounding completed. Patient alert, oriented to name only. Resting in bed, repositioned for comfort & safety. Ecchymosis to left forehead and left forearm noted, radial pulse palpable. VSS, no s/s of distress present. Reoriented as needed, plan of care explained. Safety & comfort measures in place, purposeful active rounding completed.

## 2019-05-31 NOTE — ED PROVIDER NOTE - MUSCULOSKELETAL, MLM
Spine appears normal, range of motion is not limited, no muscle or joint tenderness, B/l SLR 45 degrees, AFROM all large joints w/o evidence of pain

## 2019-05-31 NOTE — ED ADULT TRIAGE NOTE - CHIEF COMPLAINT QUOTE
fall on ASA hit head, TA initiated at triage. Pt is severe dementia and uncooperative at triage, unable to obtain vital signs

## 2019-05-31 NOTE — ED PROVIDER NOTE - EYES, MLM
Clear bilaterally, pupils equal, round and reactive to light. EOMI, negative raccoons, left forehead small area of ecchymosis, trace edema, no evidence skull fracture

## 2019-05-31 NOTE — ED PROVIDER NOTE - ENMT, MLM
Airway patent, Nasal mucosa clear. +mucous membranes slightly dry, battles negative, no signs of traumatic head injury. Throat has no vesicles, no oropharyngeal exudates and uvula is midline.

## 2019-05-31 NOTE — ED ADULT NURSE NOTE - NSIMPLEMENTINTERV_GEN_ALL_ED
Implemented All Fall with Harm Risk Interventions:  [place] to call system. Call bell, personal items and telephone within reach. Instruct patient to call for assistance. Room bathroom lighting operational. Non-slip footwear when patient is off stretcher. Physically safe environment: no spills, clutter or unnecessary equipment. Stretcher in lowest position, wheels locked, appropriate side rails in place. Provide visual cue, wrist band, yellow gown, etc. Monitor gait and stability. Monitor for mental status changes and reorient to person, place, and time. Review medications for side effects contributing to fall risk. Reinforce activity limits and safety measures with patient and family. Provide visual clues: red socks.

## 2019-05-31 NOTE — H&P ADULT - HISTORY OF PRESENT ILLNESS
89 y.o. 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 Pt is poor historian due to dementia, who denies any pain, fever, chills, CP, SOB, abd pain, dysuria, diarrhea, constipation. Denies any symptoms and denies any past surgeries. Discussed with pt's son, Ector, who reports that pt was seen on the floor around 2am today on the monitor. Pt has a gate around the bed and family was unsure how she managed to climb over the gate and fell. Pt lives with grandson and grand daughter in law.    PMH: as above  PSH: hip replacement, lumpectomy  Social Hx: nonsmoker, lives with family  Family Hx: Father-early CAD  ROS: unable to obtain due to dementia 89 y.o. 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 Pt is poor historian due to dementia, who denies any pain, fever, chills, CP, SOB, abd pain, dysuria, diarrhea, constipation. Denies any symptoms and denies any past surgeries. Discussed with pt's son, Ector, who reports that pt was seen on the floor around 2am today on the monitor. Pt has a gate around the bed and family was unsure how she managed to climb over the gate and fell. Pt lives with grandson and grand daughter in law. Grandson reports that pt had difficulty walking, weak in legs today.    PMH: as above  PSH: hip replacement, lumpectomy  Social Hx: nonsmoker, lives with family  Family Hx: Father-early CAD  ROS: unable to obtain due to dementia

## 2019-06-01 DIAGNOSIS — G45.9 TRANSIENT CEREBRAL ISCHEMIC ATTACK, UNSPECIFIED: ICD-10-CM

## 2019-06-01 DIAGNOSIS — D73.5 INFARCTION OF SPLEEN: ICD-10-CM

## 2019-06-01 DIAGNOSIS — I10 ESSENTIAL (PRIMARY) HYPERTENSION: ICD-10-CM

## 2019-06-01 DIAGNOSIS — F03.90 UNSPECIFIED DEMENTIA WITHOUT BEHAVIORAL DISTURBANCE: ICD-10-CM

## 2019-06-01 LAB
ANION GAP SERPL CALC-SCNC: 7 MMOL/L — SIGNIFICANT CHANGE UP (ref 5–17)
BASOPHILS # BLD AUTO: 0.06 K/UL — SIGNIFICANT CHANGE UP (ref 0–0.2)
BASOPHILS NFR BLD AUTO: 1 % — SIGNIFICANT CHANGE UP (ref 0–2)
BUN SERPL-MCNC: 14 MG/DL — SIGNIFICANT CHANGE UP (ref 7–23)
CALCIUM SERPL-MCNC: 9.1 MG/DL — SIGNIFICANT CHANGE UP (ref 8.5–10.1)
CHLORIDE SERPL-SCNC: 109 MMOL/L — HIGH (ref 96–108)
CO2 SERPL-SCNC: 29 MMOL/L — SIGNIFICANT CHANGE UP (ref 22–31)
CREAT SERPL-MCNC: 0.7 MG/DL — SIGNIFICANT CHANGE UP (ref 0.5–1.3)
EOSINOPHIL # BLD AUTO: 0.18 K/UL — SIGNIFICANT CHANGE UP (ref 0–0.5)
EOSINOPHIL NFR BLD AUTO: 3 % — SIGNIFICANT CHANGE UP (ref 0–6)
GLUCOSE SERPL-MCNC: 99 MG/DL — SIGNIFICANT CHANGE UP (ref 70–99)
HCT VFR BLD CALC: 30 % — LOW (ref 34.5–45)
HCT VFR BLD CALC: 32.1 % — LOW (ref 34.5–45)
HGB BLD-MCNC: 10 G/DL — LOW (ref 11.5–15.5)
HGB BLD-MCNC: 9.6 G/DL — LOW (ref 11.5–15.5)
IMM GRANULOCYTES NFR BLD AUTO: 0.3 % — SIGNIFICANT CHANGE UP (ref 0–1.5)
LYMPHOCYTES # BLD AUTO: 1.6 K/UL — SIGNIFICANT CHANGE UP (ref 1–3.3)
LYMPHOCYTES # BLD AUTO: 27 % — SIGNIFICANT CHANGE UP (ref 13–44)
MCHC RBC-ENTMCNC: 31 PG — SIGNIFICANT CHANGE UP (ref 27–34)
MCHC RBC-ENTMCNC: 31.2 GM/DL — LOW (ref 32–36)
MCV RBC AUTO: 99.4 FL — SIGNIFICANT CHANGE UP (ref 80–100)
MONOCYTES # BLD AUTO: 0.63 K/UL — SIGNIFICANT CHANGE UP (ref 0–0.9)
MONOCYTES NFR BLD AUTO: 10.6 % — SIGNIFICANT CHANGE UP (ref 2–14)
NEUTROPHILS # BLD AUTO: 3.43 K/UL — SIGNIFICANT CHANGE UP (ref 1.8–7.4)
NEUTROPHILS NFR BLD AUTO: 58.1 % — SIGNIFICANT CHANGE UP (ref 43–77)
PLATELET # BLD AUTO: 178 K/UL — SIGNIFICANT CHANGE UP (ref 150–400)
POTASSIUM SERPL-MCNC: 4 MMOL/L — SIGNIFICANT CHANGE UP (ref 3.5–5.3)
POTASSIUM SERPL-SCNC: 4 MMOL/L — SIGNIFICANT CHANGE UP (ref 3.5–5.3)
RBC # BLD: 3.23 M/UL — LOW (ref 3.8–5.2)
RBC # FLD: 15.8 % — HIGH (ref 10.3–14.5)
SODIUM SERPL-SCNC: 145 MMOL/L — SIGNIFICANT CHANGE UP (ref 135–145)
WBC # BLD: 5.92 K/UL — SIGNIFICANT CHANGE UP (ref 3.8–10.5)
WBC # FLD AUTO: 5.92 K/UL — SIGNIFICANT CHANGE UP (ref 3.8–10.5)

## 2019-06-01 PROCEDURE — 99223 1ST HOSP IP/OBS HIGH 75: CPT

## 2019-06-01 PROCEDURE — 99232 SBSQ HOSP IP/OBS MODERATE 35: CPT

## 2019-06-01 RX ORDER — RIVAROXABAN 15 MG-20MG
15 KIT ORAL EVERY 24 HOURS
Refills: 0 | Status: DISCONTINUED | OUTPATIENT
Start: 2019-06-01 | End: 2019-06-01

## 2019-06-01 RX ORDER — HEPARIN SODIUM 5000 [USP'U]/ML
5000 INJECTION INTRAVENOUS; SUBCUTANEOUS EVERY 12 HOURS
Refills: 0 | Status: DISCONTINUED | OUTPATIENT
Start: 2019-06-01 | End: 2019-06-05

## 2019-06-01 RX ADMIN — Medication 25 MILLIGRAM(S): at 17:21

## 2019-06-01 RX ADMIN — HEPARIN SODIUM 5000 UNIT(S): 5000 INJECTION INTRAVENOUS; SUBCUTANEOUS at 17:20

## 2019-06-01 RX ADMIN — Medication 0.25 MILLIGRAM(S): at 20:32

## 2019-06-01 RX ADMIN — LISINOPRIL 10 MILLIGRAM(S): 2.5 TABLET ORAL at 06:26

## 2019-06-01 RX ADMIN — Medication 25 MILLIGRAM(S): at 06:26

## 2019-06-01 RX ADMIN — ATORVASTATIN CALCIUM 40 MILLIGRAM(S): 80 TABLET, FILM COATED ORAL at 20:31

## 2019-06-01 RX ADMIN — SODIUM CHLORIDE 75 MILLILITER(S): 9 INJECTION INTRAMUSCULAR; INTRAVENOUS; SUBCUTANEOUS at 09:48

## 2019-06-01 NOTE — PROGRESS NOTE ADULT - ATTENDING COMMENTS
seen and examined 06-01-19 @ 1640    VSS    hgb - stable @ 10 g/dL    low-grade splenic injury  -no evidence of ongoing bleeding give stable vital signs and hgb  -hold ASA/Xarelto for 10 days  -reconsider risks vs benefit of anticoagulation in this elderly female with dementia    I told the patient's grandson to consider a [facility] Safe Enclosure bed to decrease similar falls in the future. seen and examined 06-01-19 @ 1640    VSS    hgb - stable @ 10 g/dL    low-grade splenic injury  -no evidence of ongoing bleeding give stable vital signs and hgb  -no contraindication to starting chemical DVT prophylaxis  -hold ASA/Xarelto for 10 days  -reconsider risks vs benefit of anticoagulation in this elderly female with dementia    I told the patient's grandson to consider a [facility] Safe Enclosure bed to decrease similar falls in the future. seen and examined 06-01-19 @ 1640    tolerating regular diet w/o nausea or vomiting    VSS  soft / NT / ND    hgb - stable @ 10 g/dL    low-grade splenic injury  -no evidence of ongoing bleeding give stable vital signs and hgb  -no contraindication to starting chemical DVT prophylaxis  -hold ASA/Xarelto for 10 days  -reconsider risks vs benefit of anticoagulation in this elderly female with dementia    I told the patient's grandson to consider a [facility] Safe Enclosure bed to decrease similar falls in the future.

## 2019-06-01 NOTE — PHYSICAL THERAPY INITIAL EVALUATION ADULT - PERTINENT HX OF CURRENT PROBLEM, REHAB EVAL
Pt presents with s/p fall at home, Discussed with pt's son, Ector, who reports that pt was seen on the floor around 2am on the monitor. Pt has a gate around the bed and family was unsure how she managed to climb over the gate and fell. Pt lives with grandson and grand daughter in law. Grandson reports that pt had difficulty walking, weak in legs.

## 2019-06-01 NOTE — CONSULT NOTE ADULT - SUBJECTIVE AND OBJECTIVE BOX
HPI:  89 y.o. 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 Pt is poor historian due to dementia, who denies any pain, fever, chills, CP, SOB, abd pain, dysuria, diarrhea, constipation. Denies any symptoms and denies any past surgeries. Discussed with pt's son, Ector, who reports that pt was seen on the floor around 2am today on the monitor. Pt has a gate around the bed and family was unsure how she managed to climb over the gate and fell. Pt lives with grandson and grand daughter in law. Grandson reports that pt had difficulty walking, weak in legs today.    PMH: as above  PSH: hip replacement, lumpectomy  Social Hx: nonsmoker, lives with family  Family Hx: Father-early CAD      Patient is a 89y old  Female who presents with a chief complaint of s/p fall (01 Jun 2019 12:49)      Consulted by Dr. Gray Pereira  for VTE prophylaxis, risk stratification, and anticoagulation management.    PAST MEDICAL & SURGICAL HISTORY:  TIA (transient ischemic attack)  HTN (hypertension)  Osteopenia  Dementia  History of hip replacement  History of lumpectomy  A. Fib on xarelto           IMPROVE VTE Individual Risk Assessment          RISK                                                          Points  [  ] Previous VTE                                                3  [  ] Thrombophilia                                             2  [  ] Lower limb paralysis                                   2        (unable to hold up >15 seconds)    [  ] Current Cancer                                             2         (within 6 months)  [ x ] Immobilization > 24 hrs                              1  [  ] ICU/CCU stay > 24 hours                             1  [ x ] Age > 60                                                         1    IMPROVE VTE Score: 2        [id number] Score: 7    IMPROVE Bleeding Risk Score: 3.5    Falls Risk:   High (x  )  Mod (  )  Low (  )    cr clr: 44.6  6-1-19 Pt seen at bedside on 5 east oob in chair.  Pt alert oriented to self.  Pleasantly confused. I called pt's son Ector to inform him I will place pt o heparin sq for now.  Questions answered.  He is concerned if his mother will go to a rehab facility after discharge will be in today to discuss plan with staff.     Vital Signs Last 24 Hrs  T(C): 36.9 (01 Jun 2019 10:52), Max: 36.9 (01 Jun 2019 10:52)  T(F): 98.5 (01 Jun 2019 10:52), Max: 98.5 (01 Jun 2019 10:52)  HR: 76 (01 Jun 2019 10:52) (65 - 79)  BP: 118/59 (01 Jun 2019 10:52) (118/59 - 156/74)  BP(mean): --  RR: 18 (01 Jun 2019 10:52) (17 - 18)  SpO2: 95% (01 Jun 2019 10:52) (93% - 98%)  FAMILY HISTORY:  Family history of early CAD (Father)    Denies any personal or familial history of clotting or bleeding disorders.    Allergies    No Known Allergies    Intolerances        REVIEW OF SYSTEMS    (  )Fever	     (  )Constipation	(  )SOB				(  )Headache	(  )Dysuria  (  )Chills	     (  )Melena	(  )Dyspnea present on exertion	                    (  )Dizziness                    (  )Polyuria  (  )Nausea	     (  )Hematochezia	(  )Cough			                    (  )Syncope   	(  )Hematuria  (  )Vomiting    (  )Chest Pain	(  )Wheezing			(  )Weakness  (  )Diarrhea     (  )Palpitations	(  )Anorexia			(  )Myalgia      Unable to obtain review of systems due to: Confused thinks she did ot fall and calling out for her grandson      PHYSICAL EXAM:    Constitutional: Appears Well    Neurological: A& O x 1 person    Skin: Warm    Respiratory and Thorax: normal effort; Breath sounds: normal; No rales/wheezing/rhonchi  	  Cardiovascular: S1, S2, regular, NMBR	    Gastrointestinal: BS + x 4Q, nontender	    Genitourinary:  Bladder nondistended, nontender    Musculoskeletal:   General Right:   no muscle/joint tenderness,   normal tone, no joint swelling,   ROM: limited	    General Left:   no muscle/joint tenderness,   normal tone, no joint swelling,   ROM: limited    Head: noted eccymotic area to left forehead,  and left arm.    Lower extrems:   Right: no calf tenderness              negative amanda's sign               + pedal pulses    Left:   no calf tenderness              negative amanda's sign               + pedal pulses                          10.0   5.92  )-----------( 178      ( 01 Jun 2019 06:46 )             32.1       06-01    145  |  109<H>  |  14  ----------------------------<  99  4.0   |  29  |  0.70    Ca    9.1      01 Jun 2019 06:46    TPro  6.0  /  Alb  2.8<L>  /  TBili  0.5  /  DBili  x   /  AST  37  /  ALT  58  /  AlkPhos  132<H>  05-31      PT/INR - ( 31 May 2019 14:58 )   PT: 25.9 sec;   INR: 2.27 ratio         PTT - ( 31 May 2019 14:58 )  PTT:37.7 sec				    MEDICATIONS  (STANDING):  atorvastatin 40 milliGRAM(s) Oral at bedtime  heparin  Injectable 5000 Unit(s) SubCutaneous every 12 hours  lisinopril 10 milliGRAM(s) Oral daily  metoprolol tartrate 25 milliGRAM(s) Oral two times a day  sodium chloride 0.9%. 1000 milliLiter(s) IV Continuous <Continuous>              DVT Prophylaxis:  LMWH                   (  )  Heparin SQ           (  )  Coumadin             (  )  Xarelto                  (x  )  Eliquis                   (  )  Venodynes           ( x )  Ambulation          (x  )  UFH                       (  )  Contraindicated  (  )  EC Aspirin             (  )

## 2019-06-01 NOTE — PHYSICAL THERAPY INITIAL EVALUATION ADULT - CRITERIA FOR SKILLED THERAPEUTIC INTERVENTIONS
predicted duration of therapy intervention/anticipated equipment needs at discharge/risk reduction/prevention/therapy frequency/rehab potential/impairments found/anticipated discharge recommendation

## 2019-06-01 NOTE — CONSULT NOTE ADULT - ASSESSMENT
This is a 89 y.o. female with PMH dementia, HTN, osteopenia, AFib on xarelto ([id number] Score: 7), L parietal CVA 3/2019 presents with s/p fall. Pt is poor historian due to dementia.  Pt has high thrombosis risk and requires treatment dose anticoagulation.    6-1-19: Noted trauma surgery Dr Denzel Emery states it is fine to resume DVT PPX. Spoke with him and he states he only wants heparin sq for now not  resume xarelto now.  Spoke with pt's son Ector and he states his mother's primary is Dr. Norwood and she will f/u with his office.  Spoke with hospitalist Dr Asher and Dr Floyd to discuss case and plan. they agree to resume anticoagulation with heparin sq.     plan;  start heparin 5,000 units sq q12h reevaluate pt tomorrow.   daily cbc  :le venodyne  :oob as per MD  :Thanks for consult will f/u

## 2019-06-02 LAB
ANION GAP SERPL CALC-SCNC: 5 MMOL/L — SIGNIFICANT CHANGE UP (ref 5–17)
BUN SERPL-MCNC: 21 MG/DL — SIGNIFICANT CHANGE UP (ref 7–23)
CALCIUM SERPL-MCNC: 8.5 MG/DL — SIGNIFICANT CHANGE UP (ref 8.5–10.1)
CHLORIDE SERPL-SCNC: 106 MMOL/L — SIGNIFICANT CHANGE UP (ref 96–108)
CO2 SERPL-SCNC: 31 MMOL/L — SIGNIFICANT CHANGE UP (ref 22–31)
CREAT SERPL-MCNC: 0.88 MG/DL — SIGNIFICANT CHANGE UP (ref 0.5–1.3)
GLUCOSE SERPL-MCNC: 172 MG/DL — HIGH (ref 70–99)
HCT VFR BLD CALC: 31.4 % — LOW (ref 34.5–45)
HGB BLD-MCNC: 9.9 G/DL — LOW (ref 11.5–15.5)
MCHC RBC-ENTMCNC: 31.5 GM/DL — LOW (ref 32–36)
MCHC RBC-ENTMCNC: 31.5 PG — SIGNIFICANT CHANGE UP (ref 27–34)
MCV RBC AUTO: 100 FL — SIGNIFICANT CHANGE UP (ref 80–100)
PLATELET # BLD AUTO: 189 K/UL — SIGNIFICANT CHANGE UP (ref 150–400)
POTASSIUM SERPL-MCNC: 4 MMOL/L — SIGNIFICANT CHANGE UP (ref 3.5–5.3)
POTASSIUM SERPL-SCNC: 4 MMOL/L — SIGNIFICANT CHANGE UP (ref 3.5–5.3)
RBC # BLD: 3.14 M/UL — LOW (ref 3.8–5.2)
RBC # FLD: 15.9 % — HIGH (ref 10.3–14.5)
SODIUM SERPL-SCNC: 142 MMOL/L — SIGNIFICANT CHANGE UP (ref 135–145)
WBC # BLD: 8.58 K/UL — SIGNIFICANT CHANGE UP (ref 3.8–10.5)
WBC # FLD AUTO: 8.58 K/UL — SIGNIFICANT CHANGE UP (ref 3.8–10.5)

## 2019-06-02 PROCEDURE — 99231 SBSQ HOSP IP/OBS SF/LOW 25: CPT

## 2019-06-02 PROCEDURE — 99233 SBSQ HOSP IP/OBS HIGH 50: CPT

## 2019-06-02 RX ORDER — ALPRAZOLAM 0.25 MG
0.25 TABLET ORAL EVERY 8 HOURS
Refills: 0 | Status: DISCONTINUED | OUTPATIENT
Start: 2019-06-02 | End: 2019-06-03

## 2019-06-02 RX ADMIN — HEPARIN SODIUM 5000 UNIT(S): 5000 INJECTION INTRAVENOUS; SUBCUTANEOUS at 06:23

## 2019-06-02 RX ADMIN — LISINOPRIL 10 MILLIGRAM(S): 2.5 TABLET ORAL at 06:23

## 2019-06-02 RX ADMIN — Medication 0.25 MILLIGRAM(S): at 21:40

## 2019-06-02 RX ADMIN — Medication 25 MILLIGRAM(S): at 06:23

## 2019-06-02 RX ADMIN — ATORVASTATIN CALCIUM 40 MILLIGRAM(S): 80 TABLET, FILM COATED ORAL at 21:40

## 2019-06-02 RX ADMIN — Medication 25 MILLIGRAM(S): at 17:37

## 2019-06-02 RX ADMIN — Medication 0.25 MILLIGRAM(S): at 14:43

## 2019-06-02 RX ADMIN — HEPARIN SODIUM 5000 UNIT(S): 5000 INJECTION INTRAVENOUS; SUBCUTANEOUS at 17:37

## 2019-06-03 LAB
ANION GAP SERPL CALC-SCNC: 5 MMOL/L — SIGNIFICANT CHANGE UP (ref 5–17)
BUN SERPL-MCNC: 15 MG/DL — SIGNIFICANT CHANGE UP (ref 7–23)
CALCIUM SERPL-MCNC: 8.9 MG/DL — SIGNIFICANT CHANGE UP (ref 8.5–10.1)
CHLORIDE SERPL-SCNC: 109 MMOL/L — HIGH (ref 96–108)
CO2 SERPL-SCNC: 31 MMOL/L — SIGNIFICANT CHANGE UP (ref 22–31)
CREAT SERPL-MCNC: 0.77 MG/DL — SIGNIFICANT CHANGE UP (ref 0.5–1.3)
GLUCOSE SERPL-MCNC: 114 MG/DL — HIGH (ref 70–99)
HCT VFR BLD CALC: 32.7 % — LOW (ref 34.5–45)
HGB BLD-MCNC: 10.3 G/DL — LOW (ref 11.5–15.5)
MCHC RBC-ENTMCNC: 31.4 PG — SIGNIFICANT CHANGE UP (ref 27–34)
MCHC RBC-ENTMCNC: 31.5 GM/DL — LOW (ref 32–36)
MCV RBC AUTO: 99.7 FL — SIGNIFICANT CHANGE UP (ref 80–100)
PLATELET # BLD AUTO: 176 K/UL — SIGNIFICANT CHANGE UP (ref 150–400)
POTASSIUM SERPL-MCNC: 4.4 MMOL/L — SIGNIFICANT CHANGE UP (ref 3.5–5.3)
POTASSIUM SERPL-SCNC: 4.4 MMOL/L — SIGNIFICANT CHANGE UP (ref 3.5–5.3)
RBC # BLD: 3.28 M/UL — LOW (ref 3.8–5.2)
RBC # FLD: 15.8 % — HIGH (ref 10.3–14.5)
SODIUM SERPL-SCNC: 145 MMOL/L — SIGNIFICANT CHANGE UP (ref 135–145)
WBC # BLD: 8.66 K/UL — SIGNIFICANT CHANGE UP (ref 3.8–10.5)
WBC # FLD AUTO: 8.66 K/UL — SIGNIFICANT CHANGE UP (ref 3.8–10.5)

## 2019-06-03 PROCEDURE — 99233 SBSQ HOSP IP/OBS HIGH 50: CPT

## 2019-06-03 RX ORDER — QUETIAPINE FUMARATE 200 MG/1
12.5 TABLET, FILM COATED ORAL DAILY
Refills: 0 | Status: DISCONTINUED | OUTPATIENT
Start: 2019-06-03 | End: 2019-06-05

## 2019-06-03 RX ORDER — QUETIAPINE FUMARATE 200 MG/1
12.5 TABLET, FILM COATED ORAL AT BEDTIME
Refills: 0 | Status: DISCONTINUED | OUTPATIENT
Start: 2019-06-03 | End: 2019-06-05

## 2019-06-03 RX ADMIN — QUETIAPINE FUMARATE 12.5 MILLIGRAM(S): 200 TABLET, FILM COATED ORAL at 21:34

## 2019-06-03 RX ADMIN — LISINOPRIL 10 MILLIGRAM(S): 2.5 TABLET ORAL at 06:18

## 2019-06-03 RX ADMIN — Medication 25 MILLIGRAM(S): at 17:45

## 2019-06-03 RX ADMIN — HEPARIN SODIUM 5000 UNIT(S): 5000 INJECTION INTRAVENOUS; SUBCUTANEOUS at 17:45

## 2019-06-03 RX ADMIN — ATORVASTATIN CALCIUM 40 MILLIGRAM(S): 80 TABLET, FILM COATED ORAL at 21:34

## 2019-06-03 RX ADMIN — HEPARIN SODIUM 5000 UNIT(S): 5000 INJECTION INTRAVENOUS; SUBCUTANEOUS at 06:18

## 2019-06-03 RX ADMIN — Medication 25 MILLIGRAM(S): at 06:17

## 2019-06-03 RX ADMIN — Medication 0.25 MILLIGRAM(S): at 13:07

## 2019-06-03 NOTE — PROGRESS NOTE ADULT - ASSESSMENT
89F s/p fall, on xarelto and ASA, ct findings showing Spelnic fluid collection, likely hematoma    -Can restart on DVT PPX  -CBC x 3 are stable  -monitor vitals  -no acute trauma intervention at this time      d/w Dr Mills
89 y old female S/P fall, small splenic hematoma, HD stable, tolerating diet, H/H stable,     pain control  Resume D/VT prophylaxis  Hold A/C, antiplatelet for 10 days  Fall precautions  medical management per primary service  No surgical intervention  Post discharge, no pushing, pulling , heavy physical activity , direct impact on left chest /flank for 3 month. Fall precautions.   Reconsult PRN
This is a 89 y.o. female with PMH dementia, HTN, osteopenia, AFib on xarelto ([id number] Score: 7), L parietal CVA 3/2019 presents with s/p fall. Pt is poor historian due to dementia.  Pt has high thrombosis risk and requires treatment dose anticoagulation.    6-1-19: Noted trauma surgery Dr Denzel Emery states it is fine to resume DVT PPX. Spoke with him and he states he only wants heparin sq for now not resume xarelto now.  Spoke with pt's son Ector and he states his mother's primary is Dr. Norwood and she will f/u with his office.  Spoke with hospitalist Dr Asher and Dr Floyd to discuss case and plan. they agree to resume anticoagulation with heparin sq.     plan;  c/w heparin 5,000 units sq q12h   monitor CBC  maintain venodynes and encourage OOB to chair    Will continue to follow.
This is a 89 y.o. female with PMH dementia, HTN, osteopenia, AFib on xarelto ([id number] Score: 7), L parietal CVA 3/2019 presents with s/p fall. Pt is poor historian due to dementia.  Pt has high thrombosis risk and requires treatment dose anticoagulation.    6-1-19: Noted trauma surgery Dr Denzel Emery states it is fine to resume DVT PPX. Spoke with him and he states he only wants heparin sq for now not  resume xarelto now.  Spoke with pt's son Ector and he states his mother's primary is Dr. Norwood and she will f/u with his office.  Spoke with hospitalist Dr Asher and Dr Floyd to discuss case and plan. they agree to resume anticoagulation with heparin sq.     plan;  cont heparin 5,000 units sq q12h cont to reevaluate pt for bleeding   daily cbc  :le venodyne  :oob as per MD
low-grade splenic injury  -no evidence of ongoing bleeding give stable vital signs and hgb  -hold ASA/Xarelto for 10 days  -reconsider risks vs benefit of anticoagulation in this elderly female with dementia
- hg staable  -repeat labs in am  - dvt proph

## 2019-06-04 RX ADMIN — QUETIAPINE FUMARATE 12.5 MILLIGRAM(S): 200 TABLET, FILM COATED ORAL at 22:37

## 2019-06-04 RX ADMIN — HEPARIN SODIUM 5000 UNIT(S): 5000 INJECTION INTRAVENOUS; SUBCUTANEOUS at 18:48

## 2019-06-04 RX ADMIN — HEPARIN SODIUM 5000 UNIT(S): 5000 INJECTION INTRAVENOUS; SUBCUTANEOUS at 05:17

## 2019-06-04 RX ADMIN — LISINOPRIL 10 MILLIGRAM(S): 2.5 TABLET ORAL at 05:17

## 2019-06-04 RX ADMIN — ATORVASTATIN CALCIUM 40 MILLIGRAM(S): 80 TABLET, FILM COATED ORAL at 22:37

## 2019-06-04 RX ADMIN — Medication 25 MILLIGRAM(S): at 18:49

## 2019-06-04 RX ADMIN — Medication 25 MILLIGRAM(S): at 05:17

## 2019-06-04 NOTE — DIETITIAN INITIAL EVALUATION ADULT. - PHYSICAL APPEARANCE
BMI 23.2; NFPE significant for severe muscle wasting: clavicle region; moderate muscle wasting: temporal, deltoid regions; fat depletion: severe: occipital; mild: triceps.

## 2019-06-04 NOTE — DIETITIAN INITIAL EVALUATION ADULT. - OTHER INFO
Pt seen as consult for poor PO intake. Pt is a 89 yo F w/ PMH dementia, HTN, osteopenia, AFib on xarelto, L parietal CVA 3/2019 presents with s/p fall. Pt is poor historian due to dementia presenting s/p fall. Upon observation pt seems confused and concerned for calling her son. D/w RN. Nursing assistant reports pt ate good size breakfast and consumed 100%, pt lunch was limited. Unable to obtain information regarding pts intake/diet PTA given pt is a poor historian. NFPE significant for severe muscle wasting: clavicle region; moderate muscle wasting: temporal, deltoid regions; fat depletion: severe: occipital; mild: triceps. Pt unable to provide wt hx however wt appears stable since 1/2019. Recommend obtaining new wt to assess for wt loss. Pt noted w/ trace edema in r/l feet. Based on above pt meets criteria for severe malnutrition in the context of chronic illness. Recommend adding ensure 1x/day to supplement PO intake. No noted c/o n/v/c/d; last BM noted 6/2. Pt seen by SLP 3/2019 recommending regular consistency and thin liquids. No noted skin breakdown; jagjit Gardner. Recommendations: 1. add ensure enlive 1x/day 2. provide assistance w/ meals PRN. 3. encourage intake at each meal w/ adequate prortein intake. 4. obtain new wt and monitor weekly. 5. add MVI w/ minerals.

## 2019-06-04 NOTE — PROGRESS NOTE ADULT - PROVIDER SPECIALTY LIST ADULT
Anticoag Management
CCU
Hospitalist
Hospitalist
Trauma Surgery
Hospitalist

## 2019-06-04 NOTE — CHART NOTE - NSCHARTNOTEFT_GEN_A_CORE
Upon Nutritional Assessment by the Registered Dietitian your patient was determined to meet criteria / has evidence of the following diagnosis/diagnoses:          [ ]  Mild Protein Calorie Malnutrition        [ ]  Moderate Protein Calorie Malnutrition        [x] Severe Protein Calorie Malnutrition        [ ] Unspecified Protein Calorie Malnutrition        [ ] Underweight / BMI <19        [ ] Morbid Obesity / BMI > 40      Findings:    Pt is poor historian due to dementia presenting s/p fall. Upon observation pt seems confused and concerned for calling her son. D/w RN. Nursing assistant reports pt ate good size breakfast and consumed 100%, pt lunch was limited. Unable to obtain information regarding pts intake/diet PTA given pt is a poor historian. NFPE significant for severe muscle wasting: clavicle region; moderate muscle wasting: temporal, deltoid regions; fat depletion: severe: occipital; mild: triceps. Pt unable to provide wt hx however wt appears stable since 1/2019. Recommend obtaining new wt to assess for wt loss. Pt noted w/ trace edema in r/l feet. Based on above pt meets criteria for severe malnutrition in the context of chronic illness. Recommend adding ensure 1x/day to supplement PO intake. No noted c/o n/v/c/d; last BM noted 6/2. Pt seen by SLP 3/2019 recommending regular consistency and thin liquids.     Findings as based on:  •  Comprehensive nutrition assessment and consultation  •  Calorie counts (nutrient intake analysis)  •  Food acceptance and intake status from observations by staff  •  Follow up  •  Patient education  •  Intervention secondary to interdisciplinary rounds  •   concerns      Treatment:      1. add ensure enlive 1x/day   2. provide assistance w/ meals PRN.   3. encourage intake at each meal w/ adequate protein intake.   4. obtain new wt and monitor weekly.   5. add MVI w/ minerals.     The following diet has been recommended:      PROVIDER Section:     By signing this assessment you are acknowledging and agree with the diagnosis/diagnoses assigned by the Registered Dietitian    Comments:

## 2019-06-04 NOTE — DIETITIAN INITIAL EVALUATION ADULT. - PERTINENT MEDS FT
MEDICATIONS  (STANDING):  atorvastatin 40 milliGRAM(s) Oral at bedtime  heparin  Injectable 5000 Unit(s) SubCutaneous every 12 hours  lisinopril 10 milliGRAM(s) Oral daily  metoprolol tartrate 25 milliGRAM(s) Oral two times a day  QUEtiapine 12.5 milliGRAM(s) Oral at bedtime    MEDICATIONS  (PRN):  acetaminophen   Tablet .. 650 milliGRAM(s) Oral every 6 hours PRN Temp greater or equal to 38C (100.4F), Mild Pain (1 - 3)  ondansetron Injectable 4 milliGRAM(s) IV Push every 6 hours PRN Nausea  QUEtiapine 12.5 milliGRAM(s) Oral daily PRN agitation

## 2019-06-04 NOTE — PROGRESS NOTE ADULT - SUBJECTIVE AND OBJECTIVE BOX
CC: Patient is a 89y old  Female who presents with a chief complaint of s/p fall (02 Jun 2019 13:30)      Subjective:  tolerating regular diet w/o nausea or vomiting  no left shoulder pain      Physical Exam:  Vital Signs Last 24 Hrs  T(C): 37.1 (02 Jun 2019 10:57), Max: 37.1 (02 Jun 2019 10:57)  T(F): 98.8 (02 Jun 2019 10:57), Max: 98.8 (02 Jun 2019 10:57)  HR: 81 (02 Jun 2019 10:57) (77 - 81)  BP: 124/59 (02 Jun 2019 10:57) (124/59 - 141/66)  BP(mean): --  RR: 17 (02 Jun 2019 10:57) (17 - 18)  SpO2: 98% (02 Jun 2019 10:57) (98% - 99%)    soft / NT / ND      Labs:                        9.9    8.58  )-----------( 189      ( 02 Jun 2019 11:22 )             31.4     PT/INR - ( 31 May 2019 14:58 )   PT: 25.9 sec;   INR: 2.27 ratio         PTT - ( 31 May 2019 14:58 )  PTT:37.7 sec  06-02    142  |  106  |  21  ----------------------------<  172<H>  4.0   |  31  |  0.88    Ca    8.5      02 Jun 2019 11:22    TPro  6.0  /  Alb  2.8<L>  /  TBili  0.5  /  DBili  x   /  AST  37  /  ALT  58  /  AlkPhos  132<H>  05-31    LIVER FUNCTIONS - ( 31 May 2019 14:58 )  Alb: 2.8 g/dL / Pro: 6.0 gm/dL / ALK PHOS: 132 U/L / ALT: 58 U/L / AST: 37 U/L / GGT: x                 Radiology:      Meds:  MEDICATIONS  (STANDING):  atorvastatin 40 milliGRAM(s) Oral at bedtime  heparin  Injectable 5000 Unit(s) SubCutaneous every 12 hours  lisinopril 10 milliGRAM(s) Oral daily  metoprolol tartrate 25 milliGRAM(s) Oral two times a day    MEDICATIONS  (PRN):  acetaminophen   Tablet .. 650 milliGRAM(s) Oral every 6 hours PRN Temp greater or equal to 38C (100.4F), Mild Pain (1 - 3)  ALPRAZolam 0.25 milliGRAM(s) Oral every 8 hours PRN agitation/anxiety  ondansetron Injectable 4 milliGRAM(s) IV Push every 6 hours PRN Nausea
HPI:  89 y.o. 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 Pt is poor historian due to dementia, who denies any pain, fever, chills, CP, SOB, abd pain, dysuria, diarrhea, constipation. Denies any symptoms and denies any past surgeries. Discussed with pt's son, Ector, who reports that pt was seen on the floor around 2am today on the monitor. Pt has a gate around the bed and family was unsure how she managed to climb over the gate and fell. Pt lives with grandson and grand daughter in law. Grandson reports that pt had difficulty walking, weak in legs today.    PMH: as above  PSH: hip replacement, lumpectomy  Social Hx: nonsmoker, lives with family  Family Hx: Father-early CAD      IMPROVE VTE Individual Risk Assessment          RISK                                                          Points  [  ] Previous VTE                                                3  [  ] Thrombophilia                                             2  [  x] Lower limb paralysis                                   2        (unable to hold up >15 seconds)    [  ] Current Cancer                                             2         (within 6 months)  [x  ] Immobilization > 24 hrs                              1  [  ] ICU/CCU stay > 24 hours                             1  [ x ] Age > 60                                                         1    IMPROVE VTE Score:         [   4    ]    Total Risk Factor Score:    0 - 1:   Consider IPC  >2 - 3:  Thromboprophylaxis required (enoxaparin or SQ heparin)        >4:   High Risk: Thromboprophylaxis required (enoxaparin or SQ heparin), optional add IPC  **If CONTRAINDICATION to enoxaparin or SQ heparin, USE IPCs**    Patient is a 89y old  Female who presents with a chief complaint of s/p fall (01 Jun 2019 12:49)      Consulted by Dr. Gray Pereira  for VTE prophylaxis, risk stratification, and anticoagulation management.    PAST MEDICAL & SURGICAL HISTORY:  TIA (transient ischemic attack)  HTN (hypertension)  Osteopenia  Dementia  History of hip replacement  History of lumpectomy  A. Fib on xarelto            [id number] Score: 7    IMPROVE Bleeding Risk Score: 3.5    Falls Risk:   High (x  )  Mod (  )  Low (  )    cr clr: 44.6  6-1-19 Pt seen at bedside on 5 east oob in chair.  Pt alert oriented to self.  Pleasantly confused. I called pt's son Ector to inform him I will place pt o heparin sq for now.  Questions answered.  He is concerned if his mother will go to a rehab facility after discharge will be in today to discuss plan with staff.   6/2: seen at bedside, pleasant but confused x 2 time and place      SpO2: 95% (01 Jun 2019 10:52) (93% - 98%)  FAMILY HISTORY:  Family history of early CAD (Father)    Denies any personal or familial history of clotting or bleeding disorders.    Allergies    No Known Allergies    Intolerances        REVIEW OF SYSTEMS    (  )Fever	     (  )Constipation	(  )SOB				(  )Headache	(  )Dysuria  (  )Chills	     (  )Melena	(  )Dyspnea present on exertion	                    (  )Dizziness                    (  )Polyuria  (  )Nausea	     (  )Hematochezia	(  )Cough			                    (  )Syncope   	(  )Hematuria  (  )Vomiting    (  )Chest Pain	(  )Wheezing			(  )Weakness  (  )Diarrhea     (  )Palpitations	(  )Anorexia			(  )Myalgia      Unable to obtain review of systems due to: Confusion      PHYSICAL EXAM:    Constitutional: Appears Well    Neurological: A& O x 1 person    Skin: Warm    Respiratory and Thorax: normal effort; Breath sounds: normal; No rales/wheezing/rhonchi  	  Cardiovascular: S1, S2, regular, NMBR	    Gastrointestinal: BS + x 4Q, nontender	    Genitourinary:  Bladder nondistended, nontender    Musculoskeletal:   General Right:   no muscle/joint tenderness,   normal tone, no joint swelling,   ROM: limited	    General Left:   no muscle/joint tenderness,   normal tone, no joint swelling,   ROM: limited    Head: noted eccymotic area to left forehead,  and left arm.    Lower extrems:   Right: no calf tenderness              negative amanda's sign               + pedal pulses    Left:   no calf tenderness              negative amanda's sign               + pedal pulses                          9.9    8.58  )-----------( 189      ( 02 Jun 2019 11:22 )             31.4       06-02    142  |  106  |  21  ----------------------------<  172<H>  4.0   |  31  |  0.88    Ca    8.5      02 Jun 2019 11:22    TPro  6.0  /  Alb  2.8<L>  /  TBili  0.5  /  DBili  x   /  AST  37  /  ALT  58  /  AlkPhos  132<H>  05-31      PT/INR - ( 31 May 2019 14:58 )   PT: 25.9 sec;   INR: 2.27 ratio         PTT - ( 31 May 2019 14:58 )  PTT:37.7 sec                          10.0   5.92  )-----------( 178      ( 01 Jun 2019 06:46 )             32.1       06-01    145  |  109<H>  |  14  ----------------------------<  99  4.0   |  29  |  0.70    Ca    9.1      01 Jun 2019 06:46    TPro  6.0  /  Alb  2.8<L>  /  TBili  0.5  /  DBili  x   /  AST  37  /  ALT  58  /  AlkPhos  132<H>  05-31      PT/INR - ( 31 May 2019 14:58 )   PT: 25.9 sec;   INR: 2.27 ratio         PTT - ( 31 May 2019 14:58 )  PTT:37.7 sec				    MEDICATIONS  (STANDING):  atorvastatin 40 milliGRAM(s) Oral at bedtime  heparin  Injectable 5000 Unit(s) SubCutaneous every 12 hours  lisinopril 10 milliGRAM(s) Oral daily  metoprolol tartrate 25 milliGRAM(s) Oral two times a day              DVT Prophylaxis:  LMWH                   (  )  Heparin SQ           (  )  Coumadin             (  )  Xarelto                  (x  )  Eliquis                   (  )  Venodynes           ( x )  Ambulation          (x  )  UFH                       (  )  Contraindicated  (  )  EC Aspirin             (  )
HPI:  89 y.o. 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 Pt is poor historian due to dementia, who denies any pain, fever, chills, CP, SOB, abd pain, dysuria, diarrhea, constipation. Denies any symptoms and denies any past surgeries. Discussed with pt's son, Ector, who reports that pt was seen on the floor around 2am today on the monitor. Pt has a gate around the bed and family was unsure how she managed to climb over the gate and fell. Pt lives with grandson and grand daughter in law. Grandson reports that pt had difficulty walking, weak in legs today.    Social Hx: nonsmoker, lives with family    Patient is a 89y old  Female who presents with a chief complaint of s/p fall (01 Jun 2019 12:49)    Consulted by Dr. Gray Pereira  for VTE prophylaxis, risk stratification, and anticoagulation management.    PAST MEDICAL & SURGICAL HISTORY:  TIA (transient ischemic attack)  HTN (hypertension)  Osteopenia  Dementia  History of hip replacement  History of lumpectomy  A. Fib on xarelto     IMPROVE VTE Individual Risk Assessment          RISK                                                          Points  [  ] Previous VTE                                                3  [  ] Thrombophilia                                             2  [  ] Lower limb paralysis                                   2        (unable to hold up >15 seconds)    [  ] Current Cancer                                             2         (within 6 months)  [ x ] Immobilization > 24 hrs                              1  [  ] ICU/CCU stay > 24 hours                             1  [ x ] Age > 60                                                         1    IMPROVE VTE Score: 2      [id number] Score: 7    IMPROVE Bleeding Risk Score: 3.5    Falls Risk:   High (x  )  Mod (  )  Low (  )    cr clr: 44.6    6-1-19 Pt seen at bedside on 5 east oob in chair.  Pt alert oriented to self.  Pleasantly confused. I called pt's son Ector to inform him I will place pt o heparin sq for now.  Questions answered.  He is concerned if his mother will go to a rehab facility after discharge will be in today to discuss plan with staff.  6/3/19: Pt seen at bedside, OOB to chair. AAOx2 [facility]. No issues with heparin SQ for VTE ppx.     FAMILY HISTORY:  Family history of early CAD (Father)  Denies any personal or familial history of clotting or bleeding disorders.    Allergies  No Known Allergies    Intolerances    REVIEW OF SYSTEMS    (  )Fever	     (  )Constipation	(  )SOB				(  )Headache	(  )Dysuria  (  )Chills	     (  )Melena	(  )Dyspnea present on exertion	                    (  )Dizziness                    (  )Polyuria  (  )Nausea	     (  )Hematochezia	(  )Cough			                    (  )Syncope   	(  )Hematuria  (  )Vomiting    (  )Chest Pain	(  )Wheezing			(  )Weakness  (  )Diarrhea     (  )Palpitations	(  )Anorexia			(  )Myalgia      Unable to obtain review of systems due to: Confused      PHYSICAL EXAM:    Vital Signs Last 24 Hrs  T(C): 37 (06-03-19 @ 11:42), Max: 37.1 (06-03-19 @ 05:17)  T(F): 98.6 (06-03-19 @ 11:42), Max: 98.7 (06-03-19 @ 05:17)  HR: 81 (06-03-19 @ 11:42) (72 - 81)  BP: 117/58 (06-03-19 @ 11:42) (117/58 - 157/68)  BP(mean): --  RR: 18 (06-03-19 @ 11:42) (18 - 18)  SpO2: 96% (06-03-19 @ 11:42) (93% - 97%)    Constitutional: Appears Well    Neurological: A& O x 2 person and [facility]    Skin: Warm    Respiratory and Thorax: normal effort; Breath sounds: normal; No rales/wheezing/rhonchi  	  Cardiovascular: S1, S2, regular, NMBR	    Gastrointestinal: BS + x 4Q, nontender	    Genitourinary:  Bladder nondistended, nontender    Musculoskeletal:   General Right:   no muscle/joint tenderness,   normal tone, no joint swelling,   ROM: limited	    General Left:   no muscle/joint tenderness,   normal tone, no joint swelling,   ROM: limited    Head: noted eccymotic area to left forehead,  and left arm.    Lower extrems:   Right: no calf tenderness              negative amanda's sign               + pedal pulses    Left:   no calf tenderness              negative amanda's sign               + pedal pulses                          10.3   8.66  )-----------( 176      ( 03 Jun 2019 08:52 )             32.7       06-03    145  |  109<H>  |  15  ----------------------------<  114<H>  4.4   |  31  |  0.77    Ca    8.9      03 Jun 2019 08:52                        10.0   5.92  )-----------( 178      ( 01 Jun 2019 06:46 )             32.1       06-01    145  |  109<H>  |  14  ----------------------------<  99  4.0   |  29  |  0.70    Ca    9.1      01 Jun 2019 06:46    TPro  6.0  /  Alb  2.8<L>  /  TBili  0.5  /  DBili  x   /  AST  37  /  ALT  58  /  AlkPhos  132<H>  05-31    PT/INR - ( 31 May 2019 14:58 )   PT: 25.9 sec;   INR: 2.27 ratio    PTT - ( 31 May 2019 14:58 )  PTT:37.7 sec				    MEDICATIONS  (STANDING):  atorvastatin 40 milliGRAM(s) Oral at bedtime  heparin  Injectable 5000 Unit(s) SubCutaneous every 12 hours  lisinopril 10 milliGRAM(s) Oral daily  metoprolol tartrate 25 milliGRAM(s) Oral two times a day    EXAM:  CT ABDOMEN AND PELVIS IC                          EXAM:  CT CHEST IC                            PROCEDURE DATE:  05/31/2019          INTERPRETATION:  CT CHEST WITH IV CONTRAST, CT ABDOMEN AND PELVIS WITH IV   CONTRAST    CLINICAL INFORMATION: Trauma      COMPARISON: None.    PROCEDURE:   CT of the Chest, Abdomen and Pelvis was performed with intravenous   contrast.   Imaging was performed through the chest in the arterial phase followed by   imaging of the abdomen and pelvis in the portal venous phase.  Oral contrast:None.  Sagittal and coronal reformats were performed.    Contrast: 90 cc Omnipaque 350    FINDINGS:    CHEST:     LUNGS, AIRWAYS: The central airways are patent. No acute airspace   disease, consolidation, or contusion.  PLEURA: Small right pleural effusion. No pneumothorax.  VESSELS: Aortic atherosclerosis without aneurysm. Main pulmonary arteries   are patent.  HEART: Mild cardiomegaly. No pericardial effusion. Coronary artery   calcifications are present.  MEDIASTINUM AND JAYMIE: No adenopathy or hematoma.  CHEST WALL: No hematoma.    ABDOMEN AND PELVIS:    LIVER: No hematoma or laceration.  BILE DUCTS: Nondilated.  GALLBLADDER: Normal.  SPLEEN: Thin subcapsular collection may represent an age-indeterminate   hematoma.  PANCREAS: Diffuse atrophy. 2.8 cm cyst in the mid body.  ADRENALS: No hemorrhage.  KIDNEYS/URETERS: No hematoma, laceration, or perinephric fluid/fluid   collection.    BLADDER: Partially obscured, however no abnormality seen.  REPRODUCTIVE ORGANS: Partially obscured, however no abnormality seen.    BOWEL: No bowel-related abnormality.  PERITONEUM: No free air, ascites, or hemoperitoneum.  VESSELS:  Aortoiliac atherosclerosis without aneurysm.  RETROPERITONEUM: No adenopathy or hematoma.    ABDOMINAL WALL: No hematoma or contusion.  BONES: Bilateral hip arthroplasty. No acute skeletal trauma. No displaced   rib fracture. No thoracolumbar spine compression fracture or traumatic   malalignment.    IMPRESSION:     Thin subcapsular splenic collection may represent an age-indeterminate hematoma. No other traumatic injury.       DVT Prophylaxis:  LMWH                   (  )  Heparin SQ           (  )  Coumadin             (  )  Xarelto                  (x  )  Eliquis                   (  )  Venodynes           ( x )  Ambulation          (x  )  UFH                       (  )  Contraindicated  (  )  EC Aspirin             (  )
Patient is a 89y old  Female who presents with a chief complaint of s/p fall (02 Jun 2019 14:27)    HPI:  89 y.o. 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 Pt is poor historian due to dementia, who denies any pain, fever, chills, CP, SOB, abd pain, dysuria, diarrhea, constipation. Denies any symptoms and denies any past surgeries. Discussed with pt's son, Ector, who reports that pt was seen on the floor around 2am today on the monitor. Pt has a gate around the bed and family was unsure how she managed to climb over the gate and fell. Pt lives with grandson and grand daughter in law. Grandson reports that pt had difficulty walking, weak in legs today.  6/3: Pt seen and examined, pain controlled, No headache, no neck pain. neurochecks stable, no sensory motor compliant. No SOB, no chest pain. No abdominal pain. No bony pelvis pain. Moves all extremities, no focal neurological complaint. No fever.  ROS:.  {] A ten-point review of systems was otherwise negative except as noted.  Systemic:	[ ] Fever	[ ] Chills	[ ] Night sweats    [ ] Fatigue	[ ] Other  [] Cardiovascular:  [] Pulmonary:  [] Renal/Urologic:  [] Gastrointestinal: abdominal pain, vomiting  [] Metabolic:  [] Neurologic:  [] Hematologic:  [] ENT:  [] Ophthalmologic:  [] Musculoskeletal:    [X ] Due to altered mental status/intubation, subjective information were not able to be obtained from the patient. History was obtained, to the extent possible, from review of the chart and collateral sources of information.    PAST MEDICAL & SURGICAL HISTORY:  TIA (transient ischemic attack)  HTN (hypertension)  Osteopenia  Dementia  History of hip replacement  History of lumpectomy  PMH: as above  PSH: hip replacement, lumpectomy  Social Hx: nonsmoker, lives with family  Family Hx: Father-early CAD  ROS: unable to obtain due to dementia (31 May 2019 18:30)      FAMILY HISTORY:  Family history of early CAD (Father)    NC  Social history:     Alcohol: Denied  Smoking: Denied  Drug Use: Denied        Vital Signs Last 24 Hrs  T(C): 37 (03 Jun 2019 11:42), Max: 37.1 (03 Jun 2019 05:17)  T(F): 98.6 (03 Jun 2019 11:42), Max: 98.7 (03 Jun 2019 05:17)  HR: 81 (03 Jun 2019 11:42) (72 - 81)  BP: 117/58 (03 Jun 2019 11:42) (117/58 - 157/68)  BP(mean): --  RR: 18 (03 Jun 2019 11:42) (18 - 18)  SpO2: 96% (03 Jun 2019 11:42) (93% - 97%)  PHYSICAL EXAM:  Constitutional: NAD, GCS: 15/15  AOX2  Eyes:  WNL  ENMT:  WNL  Neck:  WNL, non tender  Back: Non tender  Respiratory: CTABL  Cardiovascular:  S1+S2+0  Gastrointestinal: Soft, ND , NT  Genitourinary:  WNL  Extremities: NV intact  Vascular:  Intact  Neurological: No focal neurological deficit,  CN, motor and sensory system grossly intact.  Skin: WNL  Musculoskeletal: WNL  Psychiatric: Grossly WNL      Labs:                          10.3   8.66  )-----------( 176      ( 03 Jun 2019 08:52 )             32.7       06-03    145  |  109<H>  |  15  ----------------------------<  114<H>  4.4   |  31  |  0.77    Ca    8.9      03 Jun 2019 08:52            Radiology:    < from: Xray Wrist 3 Views, Left (05.31.19 @ 14:50) >    No bone destruction or fracture is evident.    IMPRESSION: Degeneration as above.  < from: CT Chest w/ IV Cont (05.31.19 @ 14:46) >    IMPRESSION:     Thin subcapsular splenic collection may represent an age-indeterminate   hematoma. No other traumatic injury.               < end of copied text >
Trauma consult called to evaluate 89 y/o female with a PMHx of Afib on Xarelto, and asa   HTN, HLD, dementia, Rt effusion, L parietal CVA in March 2019 presents to the ED s/p fall yesterday with head injury. Localized to the left side with left head injury left forearm bruise.. Trauma alert initiated upon arrival to ED. HPI as per pt's son at bedside. Unable to obtain full HPI secondary to pt's dementia.  Tertiary survey this morning     gen alert awake follows command  head: bruiseing on left forehead region stable in size  otherwise NCAAT  EOMI  PERRLA   Throat moist   ear b/l no bloody discharge  neck supple no jvd  cardiac s1 s2 rr  lungs clear  abd soft nt nd no ttp   ext no edema b/l  back no stepoff no midline tenderness  ext left forearm  ecchymosis  +DP B/L  No edema         ICU Vital Signs Last 24 Hrs  T(C): 36.7 (31 May 2019 14:02), Max: 36.7 (31 May 2019 14:02)  T(F): 98 (31 May 2019 14:02), Max: 98 (31 May 2019 14:02)  HR: 75 (31 May 2019 14:02) (75 - 75)  BP: 149/52 (31 May 2019 14:02) (149/52 - 149/52)  BP(mean): --  ABP: --  ABP(mean): --  RR: --  SpO2: --                            10.1   6.25  )-----------( 183      ( 31 May 2019 14:58 )             32.7   05-31    142  |  107  |  19  ----------------------------<  188<H>  4.0   |  28  |  0.75    Ca    8.9      31 May 2019 14:58    TPro  6.0  /  Alb  2.8<L>  /  TBili  0.5  /  DBili  x   /  AST  37  /  ALT  58  /  AlkPhos  132<H>  05-31
cc: fall History of Present Illness: 	  89 y.o. 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 Pt is poor historian due to dementia, who denies any pain, fever, chills, CP, SOB, abd pain, dysuria, diarrhea, constipation. Denies any symptoms and denies any past surgeries. Discussed with pt's son, Ector, who reports that pt was seen on the floor around 2am today on the monitor. Pt has a gate around the bed and family was unsure how she managed to climb over the gate and fell. Pt lives with grandson and grand daughter in law. Grandson reports that pt had difficulty walking, weak in legs today.    6/3: No complaints, confused at baseline. Denies fever, chills, N, V, abd pain, CP, SOB.    REVIEW OF SYSTEMS: All other review of systems is negative unless indicated above.    Physical Exam:  Vital Signs Last 24 Hrs T(C): 37.2 (03 Jun 2019 16:06), Max: 37.2 (03 Jun 2019 16:06) T(F): 98.9 (03 Jun 2019 16:06), Max: 98.9 (03 Jun 2019 16:06) HR: 84 (03 Jun 2019 16:06) (72 - 84) BP: 122/54 (03 Jun 2019 16:06) (117/58 - 157/68) BP(mean): -- RR: 18 (03 Jun 2019 16:06) (18 - 18) SpO2: 98% (03 Jun 2019 16:06) (93% - 98%)   GEN: appears comfortable, confused  Neuro: Alert, conversant, but calls out son's name  HEENT: left frontal forehead bruise, EOMI  Neck: no thyroidmegaly, no JVD  Cardiovascular: S1S2 present, irregularly irregular rhythm, no murmur  Respiratory: breath sounds normal bilaterally, no wheezing, no rales, no rhonchi  Gastrointestinal: bowel sounds normal, soft, no abdominal tenderness  Musculoskeletal: no muscle tenderness  Extremities: No edema Skin: No rash	    MEDICATIONS  (STANDING):  atorvastatin 40 milliGRAM(s) Oral at bedtime  heparin  Injectable 5000 Unit(s) SubCutaneous every 12 hours  lisinopril 10 milliGRAM(s) Oral daily  metoprolol tartrate 25 milliGRAM(s) Oral two times a day  QUEtiapine 12.5 milliGRAM(s) Oral at bedtime    MEDICATIONS  (PRN):  acetaminophen   Tablet .. 650 milliGRAM(s) Oral every 6 hours PRN Temp greater or equal to 38C (100.4F), Mild Pain (1 - 3)  ondansetron Injectable 4 milliGRAM(s) IV Push every 6 hours PRN Nausea  QUEtiapine 12.5 milliGRAM(s) Oral daily PRN agitation                              10.3   8.66  )-----------( 176      ( 03 Jun 2019 08:52 )             32.7     06-03    145  |  109<H>  |  15  ----------------------------<  114<H>  4.4   |  31  |  0.77    Ca    8.9      03 Jun 2019 08:52      CAPILLARY BLOOD GLUCOSE      Assessment and Plan:  89 y.o. 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     #s/p fall  -pt was eval by trauma and cleared from their service  -H+H stable  -hold ASA, xarelto per trauma service x 10 days due to low grade splenic injury  -PT     #AFib, hx CVA  -hold xarelto, asa  -cont BB    #dementia  -supportive care  -trial of seroquel QHS and PRN  -stop xanax   -fall precaution    #DVT ppx  -SCDs    #advanced care planning  -pt is DNR/DNI    dispo:  -[facility] tomorrow
cc: fall History of Present Illness: 	  89 y.o. 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 Pt is poor historian due to dementia, who denies any pain, fever, chills, CP, SOB, abd pain, dysuria, diarrhea, constipation. Denies any symptoms and denies any past surgeries. Discussed with pt's son, Ector, who reports that pt was seen on the floor around 2am today on the monitor. Pt has a gate around the bed and family was unsure how she managed to climb over the gate and fell. Pt lives with grandson and grand daughter in law. Grandson reports that pt had difficulty walking, weak in legs today.    6/3: No complaints, confused at baseline. Denies fever, chills, N, V, abd pain, CP, SOB.  6/4: Status quo, confused but alert.  Offers no complaints.      REVIEW OF SYSTEMS: All other review of systems is negative unless indicated above.    Physical Exam:  Vital Signs Last 24 Hrs T(C): 36.9 (04 Jun 2019 11:15), Max: 37.2 (03 Jun 2019 16:06) T(F): 98.5 (04 Jun 2019 11:15), Max: 98.9 (03 Jun 2019 16:06) HR: 77 (04 Jun 2019 11:15) (70 - 84) BP: 118/51 (04 Jun 2019 11:15) (118/51 - 145/90) BP(mean): -- RR: 18 (04 Jun 2019 11:15) (17 - 18) SpO2: 95% (04 Jun 2019 11:15) (95% - 99%)   GEN: appears comfortable, confused  Neuro: Alert, conversant, but calls out son's name  HEENT: left frontal forehead bruise, EOMI  Neck: no thyroidmegaly, no JVD  Cardiovascular: S1S2 present, irregularly irregular rhythm, no murmur  Respiratory: breath sounds normal bilaterally, no wheezing, no rales, no rhonchi  Gastrointestinal: bowel sounds normal, soft, no abdominal tenderness  Musculoskeletal: no muscle tenderness  Extremities: No edema Skin: No rash	    MEDICATIONS  (STANDING):  atorvastatin 40 milliGRAM(s) Oral at bedtime  heparin  Injectable 5000 Unit(s) SubCutaneous every 12 hours  lisinopril 10 milliGRAM(s) Oral daily  metoprolol tartrate 25 milliGRAM(s) Oral two times a day  QUEtiapine 12.5 milliGRAM(s) Oral at bedtime    MEDICATIONS  (PRN):  acetaminophen   Tablet .. 650 milliGRAM(s) Oral every 6 hours PRN Temp greater or equal to 38C (100.4F), Mild Pain (1 - 3)  ondansetron Injectable 4 milliGRAM(s) IV Push every 6 hours PRN Nausea  QUEtiapine 12.5 milliGRAM(s) Oral daily PRN agitation                              10.3   8.66  )-----------( 176      ( 03 Jun 2019 08:52 )             32.7     06-03    145  |  109<H>  |  15  ----------------------------<  114<H>  4.4   |  31  |  0.77    Ca    8.9      03 Jun 2019 08:52      CAPILLARY BLOOD GLUCOSE      Assessment and Plan:  89 y.o. 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     #s/p fall  -pt was eval by trauma and cleared from their service  -H+H stable  -hold ASA, xarelto per trauma service x 10 days due to low grade splenic injury  -PT     #AFib, hx CVA  -hold xarelto, asa  -cont BB    #dementia  -supportive care  -trial of seroquel QHS and PRN  -stop xanax   -fall precaution    #DVT ppx  -SCDs    #advanced care planning  -pt is DNR/DNI    dispo:  -JOSÉ ANTONIO tomorrow
Subjective:  no distress  sitting in chair    MEDICATIONS  (STANDING):  atorvastatin 40 milliGRAM(s) Oral at bedtime  lisinopril 10 milliGRAM(s) Oral daily  metoprolol tartrate 25 milliGRAM(s) Oral two times a day  sodium chloride 0.9%. 1000 milliLiter(s) (75 mL/Hr) IV Continuous <Continuous>    MEDICATIONS  (PRN):  acetaminophen   Tablet .. 650 milliGRAM(s) Oral every 6 hours PRN Temp greater or equal to 38C (100.4F), Mild Pain (1 - 3)  ALPRAZolam 0.25 milliGRAM(s) Oral at bedtime PRN anxiety, insomnia  ondansetron Injectable 4 milliGRAM(s) IV Push every 6 hours PRN Nausea      Allergies    No Known Allergies    Intolerances        REVIEW OF SYSTEMS:    CONSTITUTIONAL:  As per HPI.  HEENT:  Eyes:  No diplopia or blurred vision. ENT:  No earache, sore throat or runny nose.  CARDIOVASCULAR:  No pressure, squeezing, tightness, heaviness or aching about the chest, neck, axilla or epigastrium.  RESPIRATORY:  No cough, shortness of breath, PND or orthopnea.  GASTROINTESTINAL:  No nausea, vomiting or diarrhea.  GENITOURINARY:  No dysuria, frequency or urgency.  MUSCULOSKELETAL:  no joint pain, deformity, tenderness  EXTREMITIES: no clubbing cyanosis,edema  SKIN:  No change in skin, hair or nails.  NEUROLOGIC:  No paresthesias, fasciculations, seizures or weakness.  PSYCHIATRIC:  No disorder of thought or mood.  ENDOCRINE:  No heat or cold intolerance, polyuria or polydipsia.  HEMATOLOGICAL:  No easy bruising or bleedings:    Vital Signs Last 24 Hrs  T(C): 36.9 (2019 10:52), Max: 36.9 (2019 10:52)  T(F): 98.5 (2019 10:52), Max: 98.5 (2019 10:52)  HR: 76 (2019 10:52) (65 - 79)  BP: 118/59 (2019 10:52) (118/59 - 156/74)  BP(mean): --  RR: 18 (2019 10:52) (17 - 18)  SpO2: 95% (2019 10:52) (93% - 98%)    PHYSICAL EXAMINATION:  SKIN: no rashes  HEAD: NC/AT  EYES: PERRLA, EOMI  EARS: TM's intact  NOSE: no abnormalities  NECK:  Supple. No lymphadenopathy. Jugular venous pressure not elevated. Carotids equal.   HEART:   The cardiac impulse has a normal quality. Reg., Nl S1 and S2.  There are no murmurs, rubs or gallops noted  CHEST:  Chest is clear to auscultation. Normal respiratory effort.  ABDOMEN:  Soft and nontender.   EXTREMITIES:  no C/C/E  NEURO: AAO x 3, no focal deficts       LABS:                        10.0   5.92  )-----------( 178      ( 2019 06:46 )             32.1     06-    145  |  109<H>  |  14  ----------------------------<  99  4.0   |  29  |  0.70    Ca    9.1      2019 06:46    TPro  6.0  /  Alb  2.8<L>  /  TBili  0.5  /  DBili  x   /  AST  37  /  ALT  58  /  AlkPhos  132<H>  05-31    PT/INR - ( 31 May 2019 14:58 )   PT: 25.9 sec;   INR: 2.27 ratio         PTT - ( 31 May 2019 14:58 )  PTT:37.7 sec  Urinalysis Basic - ( 31 May 2019 15:06 )    Color: Yellow / Appearance: Clear / S.010 / pH: x  Gluc: x / Ketone: Negative  / Bili: Negative / Urobili: Negative mg/dL   Blood: x / Protein: Negative mg/dL / Nitrite: Negative   Leuk Esterase: Negative / RBC: x / WBC x   Sq Epi: x / Non Sq Epi: x / Bacteria: x        RADIOLOGY & ADDITIONAL TESTS:

## 2019-06-05 ENCOUNTER — TRANSCRIPTION ENCOUNTER (OUTPATIENT)
Age: 84
End: 2019-06-05

## 2019-06-05 VITALS
TEMPERATURE: 97 F | HEART RATE: 74 BPM | RESPIRATION RATE: 18 BRPM | OXYGEN SATURATION: 97 % | DIASTOLIC BLOOD PRESSURE: 51 MMHG | SYSTOLIC BLOOD PRESSURE: 108 MMHG

## 2019-06-05 DIAGNOSIS — D73.5 INFARCTION OF SPLEEN: ICD-10-CM

## 2019-06-05 RX ORDER — QUETIAPINE FUMARATE 200 MG/1
1 TABLET, FILM COATED ORAL
Qty: 30 | Refills: 0
Start: 2019-06-05

## 2019-06-05 RX ORDER — ALPRAZOLAM 0.25 MG
1 TABLET ORAL
Qty: 0 | Refills: 0 | DISCHARGE

## 2019-06-05 RX ORDER — LISINOPRIL 2.5 MG/1
1 TABLET ORAL
Qty: 0 | Refills: 0 | DISCHARGE
Start: 2019-06-05

## 2019-06-05 RX ORDER — ACETAMINOPHEN 500 MG
2 TABLET ORAL
Qty: 0 | Refills: 0 | DISCHARGE
Start: 2019-06-05

## 2019-06-05 RX ORDER — ASPIRIN/CALCIUM CARB/MAGNESIUM 324 MG
1 TABLET ORAL
Qty: 30 | Refills: 0
Start: 2019-06-05

## 2019-06-05 RX ORDER — METOPROLOL TARTRATE 50 MG
1 TABLET ORAL
Qty: 0 | Refills: 0 | DISCHARGE
Start: 2019-06-05

## 2019-06-05 RX ADMIN — LISINOPRIL 10 MILLIGRAM(S): 2.5 TABLET ORAL at 05:17

## 2019-06-05 RX ADMIN — HEPARIN SODIUM 5000 UNIT(S): 5000 INJECTION INTRAVENOUS; SUBCUTANEOUS at 05:17

## 2019-06-05 RX ADMIN — Medication 25 MILLIGRAM(S): at 05:17

## 2019-06-05 NOTE — DISCHARGE NOTE NURSING/CASE MANAGEMENT/SOCIAL WORK - NSDCDPATPORTLINK_GEN_ALL_CORE
You can access the [facility] Portal, offered by [facility], by registering with the following website: [email or web address]

## 2019-06-05 NOTE — DISCHARGE NOTE PROVIDER - NSDCCPCAREPLAN_GEN_ALL_CORE_FT
PRINCIPAL DISCHARGE DIAGNOSIS  Diagnosis: Fall  Assessment and Plan of Treatment: JOSÉ ANTONIO.      SECONDARY DISCHARGE DIAGNOSES  Diagnosis: Hematoma of spleen without rupture of capsule  Assessment and Plan of Treatment: hold anticoagulation x 10 days.    Diagnosis: Dementia  Assessment and Plan of Treatment: supportive care, trial of seroquel QHS.

## 2019-06-05 NOTE — DISCHARGE NOTE PROVIDER - HOSPITAL COURSE
cc: fall    History of Present Illness:     89 y.o. 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 Pt is poor historian due to dementia, who denies any pain, fever, chills, CP, SOB, abd pain, dysuria, diarrhea, constipation. Denies any symptoms and denies any past surgeries. Discussed with pt's son, Ector, who reports that pt was seen on the floor around 2am today on the monitor. Pt has a gate around the bed and family was unsure how she managed to climb over the gate and fell. Pt lives with grandson and grand daughter in law. Grandson reports that pt had difficulty walking, weak in legs today.        6/3: No complaints, confused at baseline. Denies fever, chills, N, V, abd pain, CP, SOB.    6/4: Status quo, confused but alert.  Offers no complaints.      6/5: Pt doing well, tolerating seroquel. Denies fever, chills, N, V, abd pain, CP, SOB.  Pt to be discharged today to [facility].        REVIEW OF SYSTEMS: All other review of systems is negative unless indicated above.        Vital Signs Last 24 Hrs    T(C): 36.3 (05 Jun 2019 11:37), Max: 37.1 (04 Jun 2019 16:23)    T(F): 97.3 (05 Jun 2019 11:37), Max: 98.8 (04 Jun 2019 16:23)    HR: 74 (05 Jun 2019 11:37) (73 - 84)    BP: 108/51 (05 Jun 2019 11:37) (108/51 - 136/69)    BP(mean): --    RR: 18 (05 Jun 2019 11:37) (17 - 18)    SpO2: 97% (05 Jun 2019 11:37) (94% - 97%)        	GEN: appears comfortable, confused    	Neuro: Alert, conversant, but calls out son's name    	HEENT: left frontal forehead bruise, EOMI    	Neck: no thyroidmegaly, no JVD    	Cardiovascular: S1S2 present, irregularly irregular rhythm, no murmur    	Respiratory: breath sounds normal bilaterally, no wheezing, no rales, no rhonchi    	Gastrointestinal: bowel sounds normal, soft, no abdominal tenderness    	Musculoskeletal: no muscle tenderness    	Extremities: No edema    Skin: No rash        meds/labs: Reviewed        Assessment and Plan:  89 y.o. 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         #s/p fall    -pt was eval by trauma and cleared from their service    -H+H stable    -hold ASA, xarelto per trauma service x 10 days due to low grade splenic injury    -PT         #AFib, hx CVA    -hold xarelto, asa x 10 days then can restart.    -cont BB        #dementia    -supportive care    -trial of seroquel QHS     -stop xanax     -fall precaution        #advanced care planning    -pt is DNR/DNI        dispo:    -JOSÉ ANTONIO         Attending Statement: 40 minutes spent on total encounter and discharge planning.

## 2019-06-05 NOTE — DISCHARGE NOTE NURSING/CASE MANAGEMENT/SOCIAL WORK - NSDCPEPTSTRK_GEN_ALL_CORE
Need for follow up after discharge/Prescribed medications/Risk factors for stroke/Stroke education booklet/Stroke support groups for patients, families, and friends/Call 911 for stroke/Signs and symptoms of stroke/Stroke warning signs and symptoms

## 2019-06-09 DIAGNOSIS — S00.83XA CONTUSION OF OTHER PART OF HEAD, INITIAL ENCOUNTER: ICD-10-CM
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
## 2019-06-09 DIAGNOSIS — J90 PLEURAL EFFUSION, NOT ELSEWHERE CLASSIFIED: ICD-10-CM

## 2019-06-09 DIAGNOSIS — E43 UNSPECIFIED SEVERE PROTEIN-CALORIE MALNUTRITION: ICD-10-CM
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
## 2019-06-09 DIAGNOSIS — F03.90 UNSPECIFIED DEMENTIA WITHOUT BEHAVIORAL DISTURBANCE: ICD-10-CM

## 2019-06-09 DIAGNOSIS — Z66 DO NOT RESUSCITATE: ICD-10-CM

## 2019-06-09 DIAGNOSIS — S36.029A UNSPECIFIED CONTUSION OF SPLEEN, INITIAL ENCOUNTER: ICD-10-CM

## 2019-06-09 DIAGNOSIS — E78.5 HYPERLIPIDEMIA, UNSPECIFIED: ICD-10-CM
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
## 2019-06-09 DIAGNOSIS — I10 ESSENTIAL (PRIMARY) HYPERTENSION: ICD-10-CM
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
## 2019-06-09 DIAGNOSIS — I48.91 UNSPECIFIED ATRIAL FIBRILLATION: ICD-10-CM

## 2020-01-01 ENCOUNTER — INPATIENT (INPATIENT)
Facility: HOSPITAL | Age: 85
LOS: 0 days | DRG: 951 | End: 2020-09-12
Attending: INTERNAL MEDICINE | Admitting: INTERNAL MEDICINE
Payer: MEDICARE

## 2020-01-01 VITALS — SYSTOLIC BLOOD PRESSURE: 98 MMHG | DIASTOLIC BLOOD PRESSURE: 50 MMHG

## 2020-01-01 VITALS
SYSTOLIC BLOOD PRESSURE: 104 MMHG | RESPIRATION RATE: 18 BRPM | HEIGHT: 63 IN | OXYGEN SATURATION: 98 % | HEART RATE: 74 BPM | WEIGHT: 126.1 LBS | DIASTOLIC BLOOD PRESSURE: 55 MMHG

## 2020-01-01 DIAGNOSIS — L89.90 PRESSURE ULCER OF UNSPECIFIED SITE, UNSPECIFIED STAGE: ICD-10-CM

## 2020-01-01 DIAGNOSIS — Z96.649 PRESENCE OF UNSPECIFIED ARTIFICIAL HIP JOINT: Chronic | ICD-10-CM

## 2020-01-01 DIAGNOSIS — Z98.890 OTHER SPECIFIED POSTPROCEDURAL STATES: Chronic | ICD-10-CM

## 2020-01-01 LAB
ALBUMIN SERPL ELPH-MCNC: 1.9 G/DL — LOW (ref 3.3–5)
ALP SERPL-CCNC: 189 U/L — HIGH (ref 40–120)
ALT FLD-CCNC: 138 U/L — HIGH (ref 12–78)
ANION GAP SERPL CALC-SCNC: 13 MMOL/L — SIGNIFICANT CHANGE UP (ref 5–17)
APTT BLD: 24.2 SEC — LOW (ref 27.5–35.5)
AST SERPL-CCNC: 243 U/L — HIGH (ref 15–37)
BASOPHILS # BLD AUTO: 0 K/UL — SIGNIFICANT CHANGE UP (ref 0–0.2)
BASOPHILS NFR BLD AUTO: 0 % — SIGNIFICANT CHANGE UP (ref 0–2)
BILIRUB SERPL-MCNC: 1.4 MG/DL — HIGH (ref 0.2–1.2)
BUN SERPL-MCNC: 64 MG/DL — HIGH (ref 7–23)
CALCIUM SERPL-MCNC: 9.8 MG/DL — SIGNIFICANT CHANGE UP (ref 8.5–10.1)
CHLORIDE SERPL-SCNC: 109 MMOL/L — HIGH (ref 96–108)
CO2 SERPL-SCNC: 21 MMOL/L — LOW (ref 22–31)
CREAT SERPL-MCNC: 2.53 MG/DL — HIGH (ref 0.5–1.3)
EOSINOPHIL # BLD AUTO: 0 K/UL — SIGNIFICANT CHANGE UP (ref 0–0.5)
EOSINOPHIL NFR BLD AUTO: 0 % — SIGNIFICANT CHANGE UP (ref 0–6)
GLUCOSE SERPL-MCNC: 281 MG/DL — HIGH (ref 70–99)
HCT VFR BLD CALC: 38 % — SIGNIFICANT CHANGE UP (ref 34.5–45)
HGB BLD-MCNC: 11.9 G/DL — SIGNIFICANT CHANGE UP (ref 11.5–15.5)
INR BLD: 1.28 RATIO — HIGH (ref 0.88–1.16)
LACTATE SERPL-SCNC: 9.7 MMOL/L — CRITICAL HIGH (ref 0.7–2)
LYMPHOCYTES # BLD AUTO: 1.25 K/UL — SIGNIFICANT CHANGE UP (ref 1–3.3)
LYMPHOCYTES # BLD AUTO: 5 % — LOW (ref 13–44)
MANUAL SMEAR VERIFICATION: SIGNIFICANT CHANGE UP
MCHC RBC-ENTMCNC: 30.3 PG — SIGNIFICANT CHANGE UP (ref 27–34)
MCHC RBC-ENTMCNC: 31.3 GM/DL — LOW (ref 32–36)
MCV RBC AUTO: 96.7 FL — SIGNIFICANT CHANGE UP (ref 80–100)
METAMYELOCYTES # FLD: 1 % — HIGH (ref 0–0)
MONOCYTES # BLD AUTO: 0.75 K/UL — SIGNIFICANT CHANGE UP (ref 0–0.9)
MONOCYTES NFR BLD AUTO: 3 % — SIGNIFICANT CHANGE UP (ref 2–14)
MYELOCYTES NFR BLD: 2 % — HIGH (ref 0–0)
NEUTROPHILS # BLD AUTO: 22.22 K/UL — HIGH (ref 1.8–7.4)
NEUTROPHILS NFR BLD AUTO: 84 % — HIGH (ref 43–77)
NEUTS BAND # BLD: 5 % — SIGNIFICANT CHANGE UP (ref 0–8)
NRBC # BLD: 0 /100 — SIGNIFICANT CHANGE UP (ref 0–0)
NRBC # BLD: SIGNIFICANT CHANGE UP /100 WBCS (ref 0–0)
PLAT MORPH BLD: NORMAL — SIGNIFICANT CHANGE UP
PLATELET # BLD AUTO: 438 K/UL — HIGH (ref 150–400)
POTASSIUM SERPL-MCNC: 5.3 MMOL/L — SIGNIFICANT CHANGE UP (ref 3.5–5.3)
POTASSIUM SERPL-SCNC: 5.3 MMOL/L — SIGNIFICANT CHANGE UP (ref 3.5–5.3)
PROT SERPL-MCNC: 6.5 GM/DL — SIGNIFICANT CHANGE UP (ref 6–8.3)
PROTHROM AB SERPL-ACNC: 14.7 SEC — HIGH (ref 10.6–13.6)
RBC # BLD: 3.93 M/UL — SIGNIFICANT CHANGE UP (ref 3.8–5.2)
RBC # FLD: 15.4 % — HIGH (ref 10.3–14.5)
RBC BLD AUTO: NORMAL — SIGNIFICANT CHANGE UP
SARS-COV-2 IGG SERPL QL IA: POSITIVE
SARS-COV-2 IGM SERPL IA-ACNC: 1.41 INDEX — HIGH
SARS-COV-2 RNA SPEC QL NAA+PROBE: SIGNIFICANT CHANGE UP
SODIUM SERPL-SCNC: 143 MMOL/L — SIGNIFICANT CHANGE UP (ref 135–145)
WBC # BLD: 24.97 K/UL — HIGH (ref 3.8–10.5)
WBC # FLD AUTO: 24.97 K/UL — HIGH (ref 3.8–10.5)

## 2020-01-01 PROCEDURE — 99498 ADVNCD CARE PLAN ADDL 30 MIN: CPT

## 2020-01-01 PROCEDURE — 71045 X-RAY EXAM CHEST 1 VIEW: CPT | Mod: 26

## 2020-01-01 PROCEDURE — 70450 CT HEAD/BRAIN W/O DYE: CPT | Mod: 26

## 2020-01-01 PROCEDURE — 99223 1ST HOSP IP/OBS HIGH 75: CPT

## 2020-01-01 PROCEDURE — 99238 HOSP IP/OBS DSCHRG MGMT 30/<: CPT

## 2020-01-01 PROCEDURE — 99285 EMERGENCY DEPT VISIT HI MDM: CPT

## 2020-01-01 PROCEDURE — 74176 CT ABD & PELVIS W/O CONTRAST: CPT | Mod: 26

## 2020-01-01 PROCEDURE — 99222 1ST HOSP IP/OBS MODERATE 55: CPT | Mod: AI

## 2020-01-01 PROCEDURE — 99285 EMERGENCY DEPT VISIT HI MDM: CPT | Mod: 25

## 2020-01-01 PROCEDURE — 99497 ADVNCD CARE PLAN 30 MIN: CPT

## 2020-01-01 PROCEDURE — 93010 ELECTROCARDIOGRAM REPORT: CPT

## 2020-01-01 RX ORDER — PIPERACILLIN AND TAZOBACTAM 4; .5 G/20ML; G/20ML
3.38 INJECTION, POWDER, LYOPHILIZED, FOR SOLUTION INTRAVENOUS ONCE
Refills: 0 | Status: DISCONTINUED | OUTPATIENT
Start: 2020-01-01 | End: 2020-01-01

## 2020-01-01 RX ORDER — ROBINUL 0.2 MG/ML
0.2 INJECTION INTRAMUSCULAR; INTRAVENOUS EVERY 6 HOURS
Refills: 0 | Status: DISCONTINUED | OUTPATIENT
Start: 2020-01-01 | End: 2020-01-01

## 2020-01-01 RX ORDER — SODIUM CHLORIDE 9 MG/ML
2000 INJECTION INTRAMUSCULAR; INTRAVENOUS; SUBCUTANEOUS ONCE
Refills: 0 | Status: COMPLETED | OUTPATIENT
Start: 2020-01-01 | End: 2020-01-01

## 2020-01-01 RX ORDER — ONDANSETRON 8 MG/1
4 TABLET, FILM COATED ORAL EVERY 6 HOURS
Refills: 0 | Status: DISCONTINUED | OUTPATIENT
Start: 2020-01-01 | End: 2020-01-01

## 2020-01-01 RX ORDER — HYDROMORPHONE HYDROCHLORIDE 2 MG/ML
0.2 INJECTION INTRAMUSCULAR; INTRAVENOUS; SUBCUTANEOUS
Refills: 0 | Status: DISCONTINUED | OUTPATIENT
Start: 2020-01-01 | End: 2020-01-01

## 2020-01-01 RX ADMIN — HYDROMORPHONE HYDROCHLORIDE 0.2 MILLIGRAM(S): 2 INJECTION INTRAMUSCULAR; INTRAVENOUS; SUBCUTANEOUS at 18:25

## 2020-01-01 RX ADMIN — HYDROMORPHONE HYDROCHLORIDE 0.2 MILLIGRAM(S): 2 INJECTION INTRAMUSCULAR; INTRAVENOUS; SUBCUTANEOUS at 12:17

## 2020-01-01 RX ADMIN — Medication 0.25 MILLIGRAM(S): at 13:25

## 2020-01-01 RX ADMIN — SODIUM CHLORIDE 2000 MILLILITER(S): 9 INJECTION INTRAMUSCULAR; INTRAVENOUS; SUBCUTANEOUS at 10:36

## 2020-07-27 NOTE — DIETITIAN INITIAL EVALUATION ADULT. - PROBLEM SELECTOR PLAN 2
[Last Bone Density ___] : Last bone density studies [unfilled] [Last Mammogram ___] : Last Mammogram was [unfilled] [Last Pap ___] : Last cervical pap smear was [unfilled] [Last Colonoscopy ___] : Last colonoscopy [unfilled] ~cont. ASA 325mg po daily  ~f/u w/ Cardiology in the am  ~currently rate controlled  ~[id number] Score is 6 (patient will likely require DOAC therapy)

## 2020-09-11 NOTE — CONSULT NOTE ADULT - ASSESSMENT
Pt is a 89y old Female with hx of TIA, HTN, osteopenia, dementia, h/o hip replacement, stroke. Presents to the ED BIBEMS from home for worsening generalized weakness x3-4 weeks. As per pt's son, pt has been less verbal recently which has also been ongoing and worsening last 4 weeks.    1) Pain   - ulcer   - abdominal pain   - Dilaudid 0.2mg IV q3hrs PRN     2) Debility   - patient mostly bed bound   - as per family significant decline in the past few weeks     3) severe protein calorie malnutrition   - significant decrease in PO intake as per family   - Albumin, Serum: 1.9 g/dL    4) sepsis   - Lactate elevated   - significant wound on coccyx   - family refusing antibiotics at this time     5) Advance care planning   - Son at bedside patient surrogate decision maker. Confirmed DNR/I, family would like to focus on comfort at this time, No more blood draws, antibiotics vital signs   - referral [facility] to [facility]  for inpatient Hospice Pt is a 89y old Female with hx of TIA, HTN, osteopenia, dementia, h/o hip replacement, stroke. Presents to the ED BIBEMS from home for worsening generalized weakness x3-4 weeks. As per pt's son, pt has been less verbal recently which has also been ongoing and worsening last 4 weeks.    1) Pain   - ulcer on coccyx    - abdominal pain   - Dilaudid 0.2mg IV q3hrs PRN     2) Debility   - patient mostly bed bound   - as per family significant decline in the past few weeks     3) severe protein calorie malnutrition   - significant decrease in PO intake as per family   - Albumin, Serum: 1.9 g/dL    4) sepsis   - Lactate elevated   - significant wound on coccyx   - family refusing antibiotics at this time   	  5) Advance care planning   - Son at bedside patient surrogate decision maker. Confirmed DNR/I, family would like to focus on comfort at this time, No more blood draws, antibiotics vital signs   - referral made to [facility]  for inpatient Hospice as patient appears to be actively approaching the end of life, no beds available at this time, will move to [facility] and continue to focus on comfort

## 2020-09-11 NOTE — ED ADULT TRIAGE NOTE - CHIEF COMPLAINT QUOTE
biba to ed from home after family noted increased weakness. family states pt in nonverbal today and has noted involuntary right arm movement. pt nonverbal in triage. responsive to touch and pain. bgm [id number]. dr. retana called for stroke evaluation. no code stroke called. molst form noted in chart. pt dnr/ dni. as per ems pt requests palliative care consult.

## 2020-09-11 NOTE — ED PROVIDER NOTE - OBJECTIVE STATEMENT
89 y/o female with a PMHx of TIA, HTN, osteopenia, dementia, h/o hip replacement, presents to the ED BIBEMS from home for worsening generalized weakness x3-4 weeks. As per pt's son, pt has been less verbal today which has also been ongoing and worsening last 4 weeks. Son discussed with sister who works here at [employer] and was told to bring in pt as pt appears to be declining and wants palliative care consult. Pt has a MOLST form with +DNR/DNI. PCP: Dr. Norwood, Cardio: Dr. oGdinez. Unable to obtain complete history secondary to pt's dementia, history obtained by pt's son.

## 2020-09-11 NOTE — ED PROVIDER NOTE - CLINICAL SUMMARY MEDICAL DECISION MAKING FREE TEXT BOX
Pt is very dry appearing, will eval for infectious ideology of symptoms, no lateralizing symptoms. Discussed with pt family and family does not want intervention for stroke but pt is also well outside of window for stroke eval. Family will like limited intervention and to discuss with Palliative care. Discussed use of pressors with family and family against this and does not want central line placement. Family amendable to antibiotics and fluids but otherwise will like minimal medial intervention.

## 2020-09-11 NOTE — ED ADULT NURSE REASSESSMENT NOTE - NS ED NURSE REASSESS COMMENT FT1
Patient resting and appears comfortable. Family at bedside. Monitoring patients respirations. Awaiting hospice.

## 2020-09-11 NOTE — H&P ADULT - NSHPLABSRESULTS_GEN_ALL_CORE
11.9   24.97 )-----------( 438      ( 11 Sep 2020 10:32 )             38.0   09-11    143  |  109<H>  |  64<H>  ----------------------------<  281<H>  5.3   |  21<L>  |  2.53<H>    Ca    9.8      11 Sep 2020 10:32    TPro  6.5  /  Alb  1.9<L>  /  TBili  1.4<H>  /  DBili  x   /  AST  243<H>  /  ALT  138<H>  /  AlkPhos  189<H>  09-11

## 2020-09-11 NOTE — CONSULT NOTE ADULT - SUBJECTIVE AND OBJECTIVE BOX
HPI: Pt is a 89y old Female with hx of TIA, HTN, osteopenia, dementia, h/o hip replacement, stroke. Presents to the ED BIBEMS from home for worsening generalized weakness x3-4 weeks. As per pt's son, pt has been less verbal recently which has also been ongoing and worsening last 4 weeks.     9/11/2020: Patient seen and examined with family at bedside, she does appears to be in pain at this time, patient exhibiting signs of pain with grimacing. Patient unable to follows any commands or answer any questions.  Son (healthcare Surrogate) and granddaughter Sasha at the patients bedside.  Pt has a MOLST form with +DNR/DNI.    PAIN: (x )Yes   ( )No  unable to further describe patient appears uncomfortable     DYSPNEA: ( x) Yes  ( ) No  Level:     PAST MEDICAL & SURGICAL HISTORY:  TIA (transient ischemic attack)  HTN (hypertension)  Osteopenia  Dementia  History of hip replacement  History of lumpectomy      SOCIAL HX:    Hx opiate tolerance ( )YES  (x )NO    Baseline ADLs  (Prior to Admission)  ( ) Independent   (x )Dependent    FAMILY HISTORY:  Family history of early CAD (Father)      Review of Systems:    Unable to obtain/Limited due to: unresponsive        PHYSICAL EXAM:    Vital Signs Last 24 Hrs  T(C): 36.7 (11 Sep 2020 11:21), Max: 36.7 (11 Sep 2020 11:21)  T(F): 98.1 (11 Sep 2020 11:21), Max: 98.1 (11 Sep 2020 11:21)  HR: 111 (11 Sep 2020 11:21) (74 - 111)  BP: 93/61 (11 Sep 2020 11:21) (93/61 - 104/55)  BP(mean): 69 (11 Sep 2020 11:21) (69 - 69)  RR: 18 (11 Sep 2020 11:21) (18 - 18)  SpO2: 96% (11 Sep 2020 11:21) (96% - 98%)  Daily Height in cm: 160.02 (11 Sep 2020 09:56)    Daily     PPSV2: 10  %  FAST: unable to assess     General: elderly cachetic female in bed, appears to be uncomfortable   Mental Status: opens eyes at time, unable to answer any questions or follow any commands   HEENT: dry oral mucosa, + temporal wasting   Lungs: diminished breath sounds   Cardiac: S1S2 +, tachycardia   GI: non distended, + tender, + BS  : + voiding   Ext: no edema present, dry skin, wound   Neuro: very lethargic and weak       LABS:                        11.9   24.97 )-----------( 438      ( 11 Sep 2020 10:32 )             38.0     09-11    143  |  109<H>  |  64<H>  ----------------------------<  281<H>  5.3   |  21<L>  |  2.53<H>    Ca    9.8      11 Sep 2020 10:32    TPro  6.5  /  Alb  1.9<L>  /  TBili  1.4<H>  /  DBili  x   /  AST  243<H>  /  ALT  138<H>  /  AlkPhos  189<H>  09-11    PT/INR - ( 11 Sep 2020 10:32 )   PT: 14.7 sec;   INR: 1.28 ratio         PTT - ( 11 Sep 2020 10:32 )  PTT:24.2 sec  Albumin: Albumin, Serum: 1.9 g/dL (09-11 @ 10:32)      Allergies    No Known Allergies    Intolerances      MEDICATIONS  (STANDING):    MEDICATIONS  (PRN):  HYDROmorphone  Injectable 0.2 milliGRAM(s) IV Push every 3 hours PRN dyspnea or severe pain  LORazepam   Injectable 0.25 milliGRAM(s) IV Push every 4 hours PRN Agitation      RADIOLOGY/ADDITIONAL STUDIES:

## 2020-09-11 NOTE — CONSULT NOTE ADULT - CONVERSATION DETAILS
met with patient family and discussed to role of palliative medicine. Patient family states that since the patient had a stroke she was never really the same after. But recently over the past 3-4 weeks patient has had a change in mental status and significant decline in eating.  Patient just hasn't been herself. Patient also rapidly developed a new pressure ulcer on her backside that has just gotten so much worse.  Family states for a long time patient has been clear that she does not want any aggressive measures, Family would like to focus on comfort at this time.  No further blood draws, vital signs (only daily) and medication for symptoms only. Family refusing antibiotics at this time as they don't think that will change the outcome.     Referral [facility] placed to [facility], at this time they have no bed availability so will keep patient comfortable here. Family is in a agreement    Emotional support provided to patient and family. Will continue to monitor

## 2020-09-11 NOTE — ED ADULT NURSE NOTE - NSIMPLEMENTINTERV_GEN_ALL_ED
Implemented All Fall with Harm Risk Interventions:  [place] to call system. Call bell, personal items and telephone within reach. Instruct patient to call for assistance. Room bathroom lighting operational. Non-slip footwear when patient is off stretcher. Physically safe environment: no spills, clutter or unnecessary equipment. Stretcher in lowest position, wheels locked, appropriate side rails in place. Provide visual cue, wrist band, yellow gown, etc. Monitor gait and stability. Monitor for mental status changes and reorient to person, place, and time. Review medications for side effects contributing to fall risk. Reinforce activity limits and safety measures with patient and family. Provide visual clues: red socks.

## 2020-09-11 NOTE — ED PROVIDER NOTE - UNABLE TO OBTAIN
Unable to obtain full HPI secondary to pt's dementia. Dementia Unable to obtain full ROS secondary to pt's dementia.

## 2020-09-11 NOTE — ED PROVIDER NOTE - CARE PLAN
Principal Discharge DX:	Pressure injury of skin, unspecified injury stage, unspecified location  Secondary Diagnosis:	Abscess  Secondary Diagnosis:	Sepsis, due to unspecified organism, unspecified whether acute organ dysfunction present

## 2020-09-11 NOTE — ED PROVIDER NOTE - PROGRESS NOTE DETAILS
Discussed goals of care with family extensively.  Was dosed with abx here for presumed sepsis from abscess, but at this time family would like to discontinue all further measures.  No further vital signs or abx or medical intervention.  This is specifically what patient had asked for.  Pt is obviously ill and has a high likelihood of death imminently.  Pall care discussed with patient and attempted hospice initiation outpatient, but unsuccessful and will require admission to hospital.  Discussed with Dr. Bergeron who has accepted for further monitoring.

## 2020-09-11 NOTE — GOALS OF CARE CONVERSATION - ADVANCED CARE PLANNING - CONVERSATION DETAILS
[facility]    [facility] however [facility] are no beds availableat this time.  Pt placed on the board.   This writer spoke with pts spouse Ector Jaramillo and informed him of above. spouse verbalized understanding.  HCN to follow up over the weekend for possible transfer to the hospice [facility] .    Leyla Barnes RN  [phone]

## 2020-09-11 NOTE — H&P ADULT - HISTORY OF PRESENT ILLNESS
89 y/o female with a PMHx of TIA, HTN, osteopenia, dementia, h/o hip replacement, presents to the ED BIBEMS from home for worsening generalized weakness x3-4 weeks. As per pt's son, pt has been less verbal today which has also been ongoing and worsening last 4 weeks. Son discussed with sister who works here at [facility] [facility] and was told to bring in pt as pt appears to be declining and wants palliative care consult. Pt has a MOLST form with +DNR/DNI. PCP: Dr. Norwood, Cardio: Dr. Godinez. Unable to obtain complete history secondary to pt's dementia, history obtained by pt's son. Plan for hospice- no bed available today

## 2020-09-12 NOTE — DISCHARGE NOTE FOR THE EXPIRED PATIENT - HOSPITAL COURSE
89 y/o female with a PMHx of TIA, HTN, osteopenia, dementia, h/o hip replacement, presents to the ED BIBEMS from home for worsening generalized weakness x3-4 weeks. As per pt's son, pt has been less verbal today which has also been ongoing and worsening last 4 weeks. Son discussed with sister who works here at [facility] and was told to bring in pt as pt appears to be declining and wants palliative care consult. Pt has a MOLST form with +DNR/DNI. Unable to obtain complete history secondary to pt's dementia, history obtained by pt's son.     Sepsis due to left gluteal region abscess   RLL lung density ? malignancy vs infectious etiology  CJ with baseline Cr 0.8  transaminitis  Advance age , dementia  Weakness    Death certificate completed online # [id number]

## 2020-09-16 LAB
CULTURE RESULTS: SIGNIFICANT CHANGE UP
SPECIMEN SOURCE: SIGNIFICANT CHANGE UP

## 2020-09-24 DIAGNOSIS — R10.9 UNSPECIFIED ABDOMINAL PAIN: ICD-10-CM

## 2020-09-24 DIAGNOSIS — Z96.649 PRESENCE OF UNSPECIFIED ARTIFICIAL HIP JOINT: ICD-10-CM

## 2020-09-24 DIAGNOSIS — I10 ESSENTIAL (PRIMARY) HYPERTENSION: ICD-10-CM

## 2020-09-24 DIAGNOSIS — E43 UNSPECIFIED SEVERE PROTEIN-CALORIE MALNUTRITION: ICD-10-CM

## 2020-09-24 DIAGNOSIS — R91.8 OTHER NONSPECIFIC ABNORMAL FINDING OF LUNG FIELD: ICD-10-CM

## 2020-09-24 DIAGNOSIS — Z79.82 LONG TERM (CURRENT) USE OF ASPIRIN: ICD-10-CM

## 2020-09-24 DIAGNOSIS — R74.0 NONSPECIFIC ELEVATION OF LEVELS OF TRANSAMINASE AND LACTIC ACID DEHYDROGENASE [LDH]: ICD-10-CM

## 2020-09-24 DIAGNOSIS — N17.9 ACUTE KIDNEY FAILURE, UNSPECIFIED: ICD-10-CM

## 2020-09-24 DIAGNOSIS — M85.80 OTHER SPECIFIED DISORDERS OF BONE DENSITY AND STRUCTURE, UNSPECIFIED SITE: ICD-10-CM

## 2020-09-24 DIAGNOSIS — Z51.5 ENCOUNTER FOR PALLIATIVE CARE: ICD-10-CM

## 2020-09-24 DIAGNOSIS — Z86.73 PERSONAL HISTORY OF TRANSIENT ISCHEMIC ATTACK (TIA), AND CEREBRAL INFARCTION WITHOUT RESIDUAL DEFICITS: ICD-10-CM

## 2020-09-24 DIAGNOSIS — L02.31 CUTANEOUS ABSCESS OF BUTTOCK: ICD-10-CM

## 2020-09-24 DIAGNOSIS — L89.109 PRESSURE ULCER OF UNSPECIFIED PART OF BACK, UNSPECIFIED STAGE: ICD-10-CM

## 2020-09-24 DIAGNOSIS — R53.1 WEAKNESS: ICD-10-CM

## 2020-09-24 DIAGNOSIS — A41.9 SEPSIS, UNSPECIFIED ORGANISM: ICD-10-CM

## 2020-09-24 DIAGNOSIS — F03.90 UNSPECIFIED DEMENTIA, UNSPECIFIED SEVERITY, WITHOUT BEHAVIORAL DISTURBANCE, PSYCHOTIC DISTURBANCE, MOOD DISTURBANCE, AND ANXIETY: ICD-10-CM

## 2020-09-24 DIAGNOSIS — R62.7 ADULT FAILURE TO THRIVE: ICD-10-CM

## 2020-09-24 DIAGNOSIS — Z66 DO NOT RESUSCITATE: ICD-10-CM

## 2020-10-16 NOTE — PHYSICAL THERAPY INITIAL EVALUATION ADULT - GAIT DISTANCE, PT EVAL
Date of last visit:  9/15/2020  Date of next visit:  11/10/2020    Requested Prescriptions     Pending Prescriptions Disp Refills    lisinopril-hydroCHLOROthiazide (PRINZIDE;ZESTORETIC) 20-25 MG per tablet [Pharmacy Med Name: LISINOPRIL-HCTZ 20-25 MG TAB] 90 tablet 1     Sig: take 1 tablet by mouth once daily 15 feet

## 2021-08-30 NOTE — DISCHARGE NOTE PROVIDER - NSDCCPGOAL_GEN_ALL_CORE_FT
To get better and follow your care plan as instructed.

## 2021-12-07 NOTE — ED PROVIDER NOTE - CPE EDP NEURO NORM
2003-Kylie sent to Dr Ya Willis  12/06/21 2004 2057-Dr An returned call and spoke with Dr Kerstin Schwab  12/06/21 2102 - - -

## 2022-11-08 NOTE — ED ADULT NURSE NOTE - NURSING NEURO LEVEL OF CONSCIOUSNESS
follows commands/alert and awake Griseofulvin Pregnancy And Lactation Text: This medication is Pregnancy Category X and is known to cause serious birth defects. It is unknown if this medication is excreted in breast milk but breast feeding should be avoided.

## 2022-11-21 NOTE — ED ADULT NURSE NOTE - NSFALLRSKINDICATORS_ED_ALL_ED
[] : A student assisted with documenting this visit. I have reviewed and verified all information documented by the student, and made modifications to such information, when appropriate. yes

## 2022-12-12 NOTE — ED PROVIDER NOTE - DISPOSITION TYPE
TRANSFER - OUT REPORT:    Verbal report given to Desire Villaseñor on Maylon Mort  being transferred to [address] [place] [address] for routine progression of patient care       Report consisted of patient's Situation, Background, Assessment and   Recommendations(SBAR). Information from the following report(s) Nurse Handoff Report and Cardiac Rhythm SR  was reviewed with the receiving nurse. [place] Assessment: No data recorded  Lines:   Peripheral IV 12/12/22 Left;Posterior Hand (Active)   Site Assessment Clean, dry & intact 12/12/22 1516   Line Status Infusing 12/12/22 [place] Connections checked and tightened 12/12/22 1516   Phlebitis Assessment No symptoms 12/12/22 1516   Infiltration Assessment 0 12/12/22 1516   Alcohol Cap Used Yes 12/12/22 1516   Dressing Status Clean, dry & intact 12/12/22 1516   Dressing Type Transparent 12/12/22 1516        Opportunity for questions and clarification was provided.       Patient transported with:  O2 @ RA lpm ADMIT

## 2023-10-27 NOTE — CONSULT NOTE ADULT - ASSESSMENT
Pt is a 89 year old woman with a PMH of HTN, TIA, and mild dementia who presented to the ED with her son and granddaughter c/o left sided weakness which was noted earlier this morning.     # STROKE  -CTA to r/o large vessel occlusion   -If CTA is negative admit to medicine for stroke work up   -Telemetry monitoring   -check lipids  -Physcial Therapy   -Repeat CT in AM - pt cannot have MRI due to old hip replacement which cannot be verified as MRI compatible PAST MEDICAL HISTORY:  Ankle fracture left -2005    Appendicitis     Bunion of left foot     Bunion of right foot     Cataract bilateral eyes    Diabetes mellitus, type 2     Dry eyes, bilateral     Ectopic pregnancy     Fibroids     Fibromyalgia     GERD (gastroesophageal reflux disease)     HLD (hyperlipidemia)     Hypertension     Lower back pain     Migraines     Primary osteoarthritis of left knee     RA (rheumatoid arthritis)     Retinopathy diabetic    TIA (transient ischemic attack) 2005, pt is taking aspirin 81 mg    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

## 2024-05-03 NOTE — DISCHARGE NOTE PROVIDER - PROVIDER TOKENS
Pt pleasant on approach, isolative to room. Denied all psychiatric symptoms, compliant with medication administration and unit routine. Pt denies any unmet needs at this moment.      PROVIDER:[TOKEN:[[id number]]],PROVIDER:[TOKEN:[[id number]]],PROVIDER:[TOKEN:[[id number]]]

## 2025-04-18 NOTE — ED ADULT NURSE NOTE - EXTENSIONS OF SELF_ADULT
Called to discuss lab results. Advised that her labwork shows inconsistent inflammatory markers. Denies any headaches, changes in vision, jaw claudication. Pt shared that she feels well overall. Has been working with Neurology re: management of her headache. Will plan to repeat ESR, CRP one more additional time in 6 months. Pt reported that she understood.    None

## 2025-06-02 NOTE — ED PROVIDER NOTE - NS_EDPROVIDERDISPOUSERTYPE_ED_A_ED
Follow up as needed    Handout on Gout    Contact your primary care doctor if there is another flare of toe pain without an injury    Shoe wear at all times except when bathing and sleeping     Theo Blood MD   [facility]  Orthopaedic Surgery   Adult and Pediatric Sports Medicine    [address] [place] [address]  Phone [phone]  Fax [phone]         Scribe Attestation (For Scribes USE Only)... Attending Attestation (For Attendings USE Only).../Scribe Attestation (For Scribes USE Only)...